# Patient Record
Sex: FEMALE | Race: WHITE | NOT HISPANIC OR LATINO | Employment: OTHER | URBAN - NONMETROPOLITAN AREA
[De-identification: names, ages, dates, MRNs, and addresses within clinical notes are randomized per-mention and may not be internally consistent; named-entity substitution may affect disease eponyms.]

---

## 2017-03-20 ENCOUNTER — COMMUNICATION - GICH (OUTPATIENT)
Dept: FAMILY MEDICINE | Facility: OTHER | Age: 77
End: 2017-03-20

## 2017-03-20 DIAGNOSIS — E78.5 HYPERLIPIDEMIA: ICD-10-CM

## 2017-03-20 DIAGNOSIS — I10 ESSENTIAL (PRIMARY) HYPERTENSION: ICD-10-CM

## 2017-03-20 DIAGNOSIS — I26.99 OTHER PULMONARY EMBOLISM WITHOUT ACUTE COR PULMONALE (H): ICD-10-CM

## 2017-05-19 ENCOUNTER — COMMUNICATION - GICH (OUTPATIENT)
Dept: FAMILY MEDICINE | Facility: OTHER | Age: 77
End: 2017-05-19

## 2017-05-19 DIAGNOSIS — I26.99 OTHER PULMONARY EMBOLISM WITHOUT ACUTE COR PULMONALE (H): ICD-10-CM

## 2017-05-26 ENCOUNTER — OFFICE VISIT - GICH (OUTPATIENT)
Dept: FAMILY MEDICINE | Facility: OTHER | Age: 77
End: 2017-05-26

## 2017-05-26 ENCOUNTER — HISTORY (OUTPATIENT)
Dept: FAMILY MEDICINE | Facility: OTHER | Age: 77
End: 2017-05-26

## 2017-05-26 DIAGNOSIS — I10 ESSENTIAL (PRIMARY) HYPERTENSION: ICD-10-CM

## 2017-05-26 DIAGNOSIS — E78.5 HYPERLIPIDEMIA: ICD-10-CM

## 2017-05-26 DIAGNOSIS — I26.99 OTHER PULMONARY EMBOLISM WITHOUT ACUTE COR PULMONALE (H): ICD-10-CM

## 2017-05-26 LAB
A/G RATIO - HISTORICAL: 1.1 (ref 1–2)
ALBUMIN SERPL-MCNC: 3.8 G/DL (ref 3.5–5.7)
ALP SERPL-CCNC: 74 IU/L (ref 34–104)
ALT (SGPT) - HISTORICAL: 12 IU/L (ref 7–52)
ANION GAP - HISTORICAL: 10 (ref 5–18)
AST SERPL-CCNC: 17 IU/L (ref 13–39)
BILIRUB SERPL-MCNC: 0.7 MG/DL (ref 0.3–1)
BUN SERPL-MCNC: 17 MG/DL (ref 7–25)
BUN/CREAT RATIO - HISTORICAL: 18
CALCIUM SERPL-MCNC: 9.6 MG/DL (ref 8.6–10.3)
CHLORIDE SERPLBLD-SCNC: 99 MMOL/L (ref 98–107)
CHOL/HDL RATIO - HISTORICAL: 2
CHOLESTEROL TOTAL: 190 MG/DL
CO2 SERPL-SCNC: 30 MMOL/L (ref 21–31)
CREAT SERPL-MCNC: 0.93 MG/DL (ref 0.7–1.3)
GFR IF NOT AFRICAN AMERICAN - HISTORICAL: 59 ML/MIN/1.73M2
GLOBULIN - HISTORICAL: 3.5 G/DL (ref 2–3.7)
GLUCOSE SERPL-MCNC: 113 MG/DL (ref 70–105)
HDLC SERPL-MCNC: 95 MG/DL (ref 23–92)
LDLC SERPL CALC-MCNC: 82 MG/DL
NON-HDL CHOLESTEROL - HISTORICAL: 95 MG/DL
PATIENT STATUS - HISTORICAL: ABNORMAL
POTASSIUM SERPL-SCNC: 3.7 MMOL/L (ref 3.5–5.1)
PROT SERPL-MCNC: 7.3 G/DL (ref 6.4–8.9)
SODIUM SERPL-SCNC: 139 MMOL/L (ref 133–143)
TRIGL SERPL-MCNC: 65 MG/DL

## 2017-10-25 ENCOUNTER — OFFICE VISIT - GICH (OUTPATIENT)
Dept: FAMILY MEDICINE | Facility: OTHER | Age: 77
End: 2017-10-25

## 2017-10-25 ENCOUNTER — HISTORY (OUTPATIENT)
Dept: FAMILY MEDICINE | Facility: OTHER | Age: 77
End: 2017-10-25

## 2017-10-25 DIAGNOSIS — Z79.01 LONG TERM CURRENT USE OF ANTICOAGULANT: ICD-10-CM

## 2017-10-25 DIAGNOSIS — R23.8 OTHER SKIN CHANGES: ICD-10-CM

## 2017-10-25 DIAGNOSIS — I26.99 OTHER PULMONARY EMBOLISM WITHOUT ACUTE COR PULMONALE (H): ICD-10-CM

## 2017-10-26 ENCOUNTER — AMBULATORY - GICH (OUTPATIENT)
Dept: FAMILY MEDICINE | Facility: OTHER | Age: 77
End: 2017-10-26

## 2017-10-26 DIAGNOSIS — T14.8XXA OTHER INJURY OF UNSPECIFIED BODY REGION, INITIAL ENCOUNTER (CODE): ICD-10-CM

## 2017-10-27 ENCOUNTER — AMBULATORY - GICH (OUTPATIENT)
Dept: LAB | Facility: OTHER | Age: 77
End: 2017-10-27

## 2017-10-27 DIAGNOSIS — T14.8XXA OTHER INJURY OF UNSPECIFIED BODY REGION, INITIAL ENCOUNTER (CODE): ICD-10-CM

## 2017-10-27 LAB
ABSOLUTE BASOPHILS - HISTORICAL: 0.1 THOU/CU MM
ABSOLUTE EOSINOPHILS - HISTORICAL: 0 THOU/CU MM
ABSOLUTE IMMATURE GRANULOCYTES(METAS,MYELOS,PROS) - HISTORICAL: 0 THOU/CU MM
ABSOLUTE LYMPHOCYTES - HISTORICAL: 1.9 THOU/CU MM (ref 0.9–2.9)
ABSOLUTE MONOCYTES - HISTORICAL: 0.7 THOU/CU MM
ABSOLUTE NEUTROPHILS - HISTORICAL: 5.9 THOU/CU MM (ref 1.7–7)
BASOPHILS # BLD AUTO: 0.6 %
EOSINOPHIL NFR BLD AUTO: 0.4 %
ERYTHROCYTE [DISTWIDTH] IN BLOOD BY AUTOMATED COUNT: 13.7 % (ref 11.5–15.5)
HCT VFR BLD AUTO: 44.3 % (ref 33–51)
HEMOGLOBIN: 14.3 G/DL (ref 12–16)
IMMATURE GRANULOCYTES(METAS,MYELOS,PROS) - HISTORICAL: 0.4 %
LYMPHOCYTES NFR BLD AUTO: 21.7 % (ref 20–44)
MCH RBC QN AUTO: 31.8 PG (ref 26–34)
MCHC RBC AUTO-ENTMCNC: 32.3 G/DL (ref 32–36)
MCV RBC AUTO: 98 FL (ref 80–100)
MONOCYTES NFR BLD AUTO: 8.3 %
NEUTROPHILS NFR BLD AUTO: 68.6 % (ref 42–72)
PLATELET # BLD AUTO: 283 THOU/CU MM (ref 140–440)
PMV BLD: 9.8 FL (ref 6.5–11)
RED BLOOD COUNT - HISTORICAL: 4.5 MIL/CU MM (ref 4–5.2)
WHITE BLOOD COUNT - HISTORICAL: 8.5 THOU/CU MM (ref 4.5–11)

## 2017-12-28 NOTE — PROGRESS NOTES
Patient Information     Patient Name MRN Lelo Carlson [id number] Female 1940      Progress Notes by Whit Cortez MD at 10/25/2017  9:15 AM     Author:  Whit Cortez MD Service:  (none) Author Type:  Physician     Filed:  10/26/2017  4:03 PM Encounter Date:  10/25/2017 Status:  Signed     :  Whit Cortez MD (Physician)            SUBJECTIVE:  Lelo Ríos is a 77 y.o. female who has a history of PE and is on xarelto daily and has been for about a year and was sitting reading yesterday and had a cramp sensation in right wrist area and then rubbed it fairly vigorously and later when she looked at it was quite bruised. It became more extensively bruised as the day went on. She has no other bruising or bleeding issues. It is really not sore but she was worried about a clot is not a bruise. As noted history of PE and was initially on warfarin but then after going off had recurrence so she is now on long-term medication and decided to use Xarelto.  Social History     Social History        Marital status:  Single     Spouse name: N/A     Number of children:  N/A     Years of education:  N/A     Occupational History      Not on file.     Social History Main Topics         Smoking status:   Former Smoker     Smokeless tobacco:   Never Used      Comment: previously a social smoker      Alcohol use   Yes      Comment: occassionally      Drug use:   No     Sexual activity:   Not on file     Other Topics  Concern     Not on file      Social History Narrative     Single.  No children but did raise twin boys for a period of time.     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     She has recently retired from her position at [employer].     She also works for [employer].                       OBJECTIVE:  /90  Pulse 84  Temp 97.2  F (36.2  C) (Tympanic)  Wt 88.4 kg (194 lb 12.8 oz)  Breastfeeding? No   BMI 31.44 kg/m2    Appearance: in no apparent distress.  Right wrist volar aspect with a large ecchymotic area that is nontender. No evidence to suggest clotting as she was worried about. This is a contusion or bruise only and suspect she had a venous bleed and her wrist area.    ASSESSMENT:  1. Easy bruising    2. Recurrent pulmonary embolism (HC)    3. Anticoagulant long-term use          PLAN:  Reassurance that this will resolve. After the patient left I reviewed her labs and she has never had a CBC or platelet count while on Xarelto which can cause a drop. She was contacted and advised to come back for CBC only.  Whit Cortez MD  4:03 PM 10/26/2017   Portions of this dictation were created using the Dragon Nuance voice recognition system. Proofreading was completed but there may be errors in text.

## 2017-12-28 NOTE — PATIENT INSTRUCTIONS
Patient Information     Patient Name MRN Lelo Carlson [id number] Female 1940      Patient Instructions by Whit Cortez MD at 10/25/2017  9:43 AM     Author:  Whit Cortez MD  Service:  (none) Author Type:  Physician     Filed:  10/25/2017  9:43 AM  Encounter Date:  10/25/2017 Status:  Addendum     :  Whit Cortez MD (Physician)        Related Notes: Original Note by Whit Cortez MD (Physician) filed at 10/25/2017  9:43 AM               Index Tajik   Bruise   ________________________________________________________________________  KEY POINTS    A bruise is an injury that causes bleeding under the skin that becomes an area of discolored skin.    Most of the time you don t need to see your healthcare provider for treatment. Your body will repair the bruised area and your skin will return to a normal color.    Rest the part of your body that is bruised if it is painful.  ________________________________________________________________________  What is a bruise?   A bruise is an injury that causes bleeding under the skin that becomes an area of discolored skin. Another word for bruise is contusion.  What is the cause?  A bruise is caused by a break in the small blood vessels under your skin. Blood pools under your skin and causes your skin to look a different color. Bruises often result from an injury, like when you fall or get hit by something.  There are things that may make you bruise more easily, such as medicines or supplements, a lack of certain vitamins, some medical conditions or a blood-clotting problem. Older adults bruise more easily because their blood vessels are more fragile and their skin thins with age.  What are the symptoms?   Symptoms may include pain, swelling, and discolored skin. Bruises can be very small or very large. Some bruises may cause only a little tenderness, but deep bruises of muscles can be painful. Bruises are usually blue, dark red, or  purple at first and then slowly fade over a couple of weeks to shades of brown, yellow, and green.  How are they treated?   Most of the time you don t need to see your healthcare provider for treatment. Your body will repair the bruised area and your skin will return to a normal color.  Some bruises can be a sign of a more serious injury or illness, such as:    Bruises on the abdomen, chest, or lower back    Bruising around both eyes after injury to the head    Bruises that are very swollen and painful    Bruises that appear for no reason or large bruises following only minor bumps or injuries  See your healthcare provider for these types of bruises to make sure they are not symptoms of a serious problem.  How can I take care of myself?   To keep swelling down and help relieve pain for the first few days after an injury:     Rest the part of your body that is bruised if it is painful.    Put an ice pack, cold gel pack, or package of frozen vegetables wrapped in a cloth on the bruised area every 3 to 4 hours for up to 20 minutes at a time for the first day or two after the injury.    Lightly wrap the bruised area with an elastic bandage (Ace wrap) or soft cloth if it is swollen.    Keep your bruised arm or leg up on pillows when you sit or lie down.    Take nonprescription pain medicine, such as acetaminophen, ibuprofen, or naproxen. Read the label and take as directed. Unless recommended by your healthcare provider, you should not take these medicines for more than 10 days.    Nonsteroidal anti-inflammatory medicines (NSAIDs), such as ibuprofen, naproxen, and aspirin, may cause stomach bleeding and other problems. These risks increase with age.    Acetaminophen may cause liver damage or other problems. Unless recommended by your provider, don't take more than 3000 milligrams (mg) in 24 hours. To make sure you don t take too much, check other medicines you take to see if they also contain acetaminophen. Ask your  provider if you need to avoid drinking alcohol while taking this medicine.    Don t massage the bruised area.    After 2 to 3 days, if the swelling is gone, moist heat may help relieve pain, relax your muscles, and make it easier to move. Put moist heat on the sore area for up to 30 minutes to relieve pain. Moist heat includes heat patches or moist heating pads that you can buy at most drugstores, a warm wet washcloth, or a hot shower. To prevent burns to your skin, follow directions on the package and do not lie on any type of hot pad.   Don't use heat on the bruise for the first 2 to 3 days. Heat may make bruising and swelling worse.  Most bruises go away in a couple weeks. Severe bruises cause deep tissue damage and may take several weeks to heal.  Developed by [employer].  Adult Advisor 2016.3 published by [employer].  Last modified: 2016-05-12  Last reviewed: 2014-06-02  This content is reviewed periodically and is subject to change as new health information becomes available. The information is intended to inform and educate and is not a replacement for medical evaluation, advice, diagnosis or treatment by a healthcare professional.  References   Adult Advisor 2016.3 Index    Copyright   2016 [employer], a division of [employer] All rights reserved.

## 2017-12-30 NOTE — NURSING NOTE
Patient Information     Patient Name MRN Lelo Carlson [id number] Female 1940      Nursing Note by Lyn Buitrago at 10/25/2017  9:15 AM     Author:  Lyn Buitrago Service:  (none) Author Type:  (none)     Filed:  10/25/2017  9:35 AM Encounter Date:  10/25/2017 Status:  Signed     :  Lyn Buitrago            Patient presents today for right wrist problem.  Patient did not injure her wrist, but she felt like she had a cramp in her wrist yesterday.  Patient noticed later in the day her wrist turning black and blue.   Lyn Buitrago LPN..............10/25/2017 9:29 AM

## 2018-01-03 NOTE — TELEPHONE ENCOUNTER
Patient Information     Patient Name MRN Sex Lelo Isabel [id number] Female 1940      Telephone Encounter by Musa Randhawa RN at 3/20/2017  3:07 PM     Author:  Musa Randhawa RN Service:  (none) Author Type:  NURS- Registered Nurse     Filed:  3/20/2017  3:15 PM Encounter Date:  3/20/2017 Status:  Signed     :  Musa Randhawa RN (NURS- Registered Nurse)            Angiotensin Receptor Blockers (ARB)    Office visit in the past 12 months or per provider note.    Last visit with KELSI DIAS was on: 2016 in [facility]  Next visit with KELSI DIAS is on: No future appointment listed with this provider  Next visit with Family Practice is on: No future appointment listed in this department    Lab test requirements:  Creatinine and Potassium annually, if ordering lab, order BMP.  CREATININE (mg/dL)    Date Value   2016 0.93     POTASSIUM (mmol/L)    Date Value   2016 4.0       Max refill for 12 months from last office visit or per provider note    Statins    Office visit in the past 12 months.    Last visit with KELSI DIAS was on: 2016 in [facility]  Next visit with KELSI DIAS is on: No future appointment listed with this provider  Next visit with Family Practice is on: No future appointment listed in this department    Last Lipids:  Chol: 182    2/15/2016  T    2/15/2016  HDL:   84    2/15/2016  LDL:  83    2/15/2016  LDL DIRECT:  No results found in past 5 years    .    Max refills 12 months from last office visit.     Patient is due for medication management appointment for HTN and hyperlipidemia and annual labs with relation to hyperlipidemia. Last office visit to address both above dxs on 16 per chart review. Limited refill provided at this time and letter sent for reminder to patient. Prescription refilled per RN Medication Refill Policy.................... Musa Randhawa RN ....................  3/20/2017   3:08 PM

## 2018-01-04 NOTE — TELEPHONE ENCOUNTER
Patient Information     Patient Name MRN Sex Lelo Isabel [id number] Female 1940      Telephone Encounter by Musa Randhawa RN at 3/20/2017  3:02 PM     Author:  Musa Randhawa RN Service:  (none) Author Type:  NURS- Registered Nurse     Filed:  3/20/2017  3:15 PM Encounter Date:  3/20/2017 Status:  Signed     :  Musa Randhawa RN (NURS- Registered Nurse)            Redundant Refill Request for xarelto refused;    Prescribing Provider: Kelsi Dias MD                 Order Date: 2016  Ordered by: KELSI DIAS  Medication:rivaroxaban (XARELTO) 20 mg tablet    Qty:90 tablet   Ref:3  Start:2016   End:              Route:Oral                Class:eRx    Sig:Take 1 tablet by mouth once daily with evening meal. Start after you have         completed the twice daily regimen for the first 21 days.    Pharmacy:[address]    Unable to complete prescription refill per RN Medication Refill Policy.................... Musa Randhawa RN ....................  3/20/2017   3:05 PM

## 2018-01-04 NOTE — TELEPHONE ENCOUNTER
Patient Information     Patient Name MRN Lelo Carlson [id number] Female 1940      Telephone Encounter by Musa Randhawa RN at 3/22/2017  9:18 AM     Author:  Musa Randhawa RN Service:  (none) Author Type:  NURS- Registered Nurse     Filed:  3/22/2017  9:21 AM Encounter Date:  3/20/2017 Status:  Signed     :  Musa Randhawa RN (NURS- Registered Nurse)            Additional faxed request received from [facility] in [facility] for patient's xarelto 20 mg tabs. Chart review shows that rx was filled for a years supply on 16 as listed below:    Prescribing Provider: Robert Dias MD                 Order Date: 2016  Ordered by: ROBERT DIAS  Medication:rivaroxaban (XARELTO) 20 mg tablet    Qty:90 tablet   Ref:3  Start:2016   End:              Route:Oral                Class:eRx    Sig:Take 1 tablet by mouth once daily with evening meal. Start after you have         completed the twice daily regimen for the first 21 days.    Pharmacy:[place] [id number] [place] [address]    Writer had previously refused rx request on 3/20/17. Call placed to [facility] pharmacy. Spoke to [facility], pharmacist. Advised her of above. [facility] unsure why her system is sending additional requests as she sees rx as listed above in her system. [facility] will fix issue on her end, and refill rx as previously prescribed. Nothing further needed from this writer per pharmacist.    Unable to complete prescription refill per RN Medication Refill Policy.................... Musa Randhawa RN ....................  3/22/2017   9:21 AM

## 2018-01-05 NOTE — NURSING NOTE
Patient Information     Patient Name MRLelo Ellis [id number] Female 1940      Nursing Note by Shyla Valle at 2017  9:00 AM     Author:  Shyla Valle Service:  (none) Author Type:  NURS- Licensed Practical Nurse     Filed:  2017  9:04 AM Encounter Date:  2017 Status:  Signed     :  Shyla Valle            Patient presents today for medication management.  Shyla Valle LPN .............2017  8:48 AM

## 2018-01-05 NOTE — TELEPHONE ENCOUNTER
Patient Information     Patient Name MRN Sex Lelo Isabel [id number] Female 1940      Telephone Encounter by Twyla Pike RN at 2017  4:07 PM     Author:  Twyla Pike RN Service:  (none) Author Type:  NURS- Registered Nurse     Filed:  2017  4:15 PM Encounter Date:  2017 Status:  Signed     :  Twyla Pike RN (NURS- Registered Nurse)            This is a Refill request from: [facility]  Name of Medication: Xarelto 20 mg  Quantity requested: 90  Last fill date: 16 #90 with 3 refills  Due for refill: yes  Last visit with ROBERT DIAS was on: 2016 in [facility]  PCP:  Robert Dias MD  Controlled Substance Agreement:  N/A   Diagnosis r/t this medication request: PE     Unable to complete prescription refill per RN Medication Refill Policy (Not on our guideline of medications to refill).................... TWYLA PIKE RN ....................  2017   4:08 PM

## 2018-01-05 NOTE — PROGRESS NOTES
Patient Information     Patient Name MRN Sex Lelo Isabel [id number] Female 1940      Progress Notes by Robert Herrera MD at 2017  9:00 AM     Author:  Robert Herrera MD Service:  (none) Author Type:  Physician     Filed:  2017  9:17 AM Encounter Date:  2017 Status:  Signed     :  Robert Herrera MD (Physician)            SUBJECTIVE:  Lelo Ríos is a 76 y.o. female here for follow-up.  Patient has a history of recurrent thromboembolism with DVTs and PEs. She has been on Xarelto for a year and is doing well. She's not had any significant bleeding or bruising.    She also has a history of hyperlipidemia and hypertension, both of which have been stable. She is due for fasting labs.    She's had no chest pain, short of breath, headaches, vision change, nausea, vomiting, diarrhea, constipation, bladder changes or skin concerns. She does have some chronic left lower extremity edema which is where she had a DVT previously.      Patient Active Problem List       Diagnosis  Date Noted     Recurrent pulmonary embolism (HC)  2016     HYPERTENSION       HYPERLIPIDEMIA       MENOPAUSE, SURGICAL       RENALDO/BSO          FIBROCYSTIC BREAST DISEASE       With right breast mass.          OBESITY       Mild            Past Medical History:     Diagnosis  Date     HERPES ZOSTER 2012    right side, back.       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       atorvastatin (LIPITOR) 20 mg tablet Take 0.5 tablets by mouth once daily. 45 tablet 3     CALCIUM CARBONATE/VITAMIN D2 (CALCIUM 600 + D ORAL) Take 1 Tab by mouth 2 times daily.       latanoprost (XALATAN) 0.005 % ophthalmic solution Place 1 Drop into both eyes at bedtime. 2.5 mL 0     rivaroxaban (XARELTO) 20 mg tablet Take 1 tablet by mouth once daily with evening meal. 90 tablet 3     valsartan-hydrochlorothiazide (DIOVAN HCT) 160-25 mg per tablet Take 1 tablet by mouth once daily. 90 tablet 3     No current facility-administered medications for this visit.      Medications have been reviewed by me and are current to the best of my knowledge and ability.      Allergies:  No Known Allergies    Family History       Problem   Relation Age of Onset     Cancer-breast  No Family History      Heart Disease  Father      Other  Mother      liver cancer         Social History       Substance Use Topics         Smoking status:   Former Smoker     Smokeless tobacco:   Never Used      Comment: previously a social smoker      Alcohol use   Yes       Comment: occassionally        ROS:    As above otherwise ROS is unremarkable.      OBJECTIVE:  /86  Pulse 84  Ht 1.676 m (5' 6\")  Wt 87 kg (191 lb 12.8 oz)  BMI 30.96 kg/m2    EXAM:  General Appearance: Pleasant, alert, appropriate appearance for age. No acute distress  Head: Normal. Normocephalic, atraumatic.  Eyes: PERRL, EOMI  Ears: Normal TM's bilaterally. Normal auditory canals and external ears.   OroPharynx: Dental hygiene adequate. Normal buccal mucosa. Normal pharynx.  Neck: Supple, no masses or nodes, no lymphadenopathy.  No thyromegaly.  Lungs: Normal chest wall and respirations. Clear to auscultation, no wheezes or crackles.  Cardiovascular: Regular rate and rhythm. S1, S2, no murmurs.  Gastrointestinal: Soft, nontender, no abnormal masses or organomegaly. BS normal.  Musculoskeletal: Trace left lower extremity " edema.  Skin: no concerning or new rashes.  Neurologic Exam: CN 2-12 grossly intact.  Normal gait.  Symmetric DTRs, No focal motor or sensory deficits. No tremor.  Psychiatric Exam: Alert and oriented, appropriate affect.    ASSESSEMENT AND PLAN:    Lelo was seen today for medication management.    Diagnoses and all orders for this visit:    Essential hypertension  -     valsartan-hydrochlorothiazide (DIOVAN HCT) 160-25 mg per tablet; Take 1 tablet by mouth once daily.    Hyperlipidemia, unspecified hyperlipidemia type  -     atorvastatin (LIPITOR) 20 mg tablet; Take 0.5 tablets by mouth once daily.  -     LIPID PANEL; Future  -     COMPLETE METABOLIC PANEL; Future    Recurrent pulmonary embolism (HC)  -     rivaroxaban (XARELTO) 20 mg tablet; Take 1 tablet by mouth once daily with evening meal.    Blood pressure is controlled, continue current regimen. We'll get fasting labs today. Continue half tablet of Lipitor daily.    She'll continue Xarelto she is tolerating this well.    She's up-to-date on immunizations, will be due for tetanus in 2021.      Rah Herrera MD

## 2018-01-23 ENCOUNTER — DOCUMENTATION ONLY (OUTPATIENT)
Dept: FAMILY MEDICINE | Facility: OTHER | Age: 78
End: 2018-01-23

## 2018-01-23 PROBLEM — N60.19 FIBROCYSTIC BREAST DISEASE: Status: ACTIVE | Noted: 2018-01-23

## 2018-01-23 PROBLEM — I10 HYPERTENSION: Status: ACTIVE | Noted: 2018-01-23

## 2018-01-23 PROBLEM — E66.9 OBESITY: Status: ACTIVE | Noted: 2018-01-23

## 2018-01-23 PROBLEM — E89.41 SYMPTOMATIC STATES ASSOCIATED WITH ARTIFICIAL MENOPAUSE: Status: ACTIVE | Noted: 2018-01-23

## 2018-01-23 PROBLEM — E78.5 HYPERLIPIDEMIA: Status: ACTIVE | Noted: 2018-01-23

## 2018-01-23 PROBLEM — Z79.01 ANTICOAGULANT LONG-TERM USE: Status: ACTIVE | Noted: 2017-10-26

## 2018-01-23 RX ORDER — VALSARTAN AND HYDROCHLOROTHIAZIDE 160; 25 MG/1; MG/1
1 TABLET ORAL DAILY
COMMUNITY
Start: 2017-05-26 | End: 2018-05-25

## 2018-01-23 RX ORDER — LATANOPROST 50 UG/ML
1 SOLUTION/ DROPS OPHTHALMIC AT BEDTIME
COMMUNITY
Start: 2014-06-11

## 2018-01-23 RX ORDER — ATORVASTATIN CALCIUM 20 MG/1
10 TABLET, FILM COATED ORAL DAILY
COMMUNITY
Start: 2017-05-26 | End: 2018-06-04

## 2018-01-27 VITALS
BODY MASS INDEX: 31.44 KG/M2 | WEIGHT: 194.8 LBS | TEMPERATURE: 97.2 F | DIASTOLIC BLOOD PRESSURE: 90 MMHG | HEART RATE: 84 BPM | SYSTOLIC BLOOD PRESSURE: 142 MMHG

## 2018-01-27 VITALS
HEIGHT: 66 IN | DIASTOLIC BLOOD PRESSURE: 86 MMHG | SYSTOLIC BLOOD PRESSURE: 134 MMHG | WEIGHT: 191.8 LBS | HEART RATE: 84 BPM | BODY MASS INDEX: 30.82 KG/M2

## 2018-05-15 ENCOUNTER — TELEPHONE (OUTPATIENT)
Dept: FAMILY MEDICINE | Facility: OTHER | Age: 78
End: 2018-05-15

## 2018-05-15 DIAGNOSIS — I26.99 RECURRENT PULMONARY EMBOLISM (H): Primary | ICD-10-CM

## 2018-05-15 NOTE — TELEPHONE ENCOUNTER
Received a fax from [facility] requesting a new rx for xarelto 20 mg tablets.  Shyla Valle LPN .............5/15/2018  10:30 AM

## 2018-05-23 DIAGNOSIS — I10 BENIGN ESSENTIAL HYPERTENSION: Primary | ICD-10-CM

## 2018-05-29 RX ORDER — VALSARTAN AND HYDROCHLOROTHIAZIDE 160; 25 MG/1; MG/1
1 TABLET ORAL DAILY
Qty: 90 TABLET | Refills: 3 | Status: SHIPPED | OUTPATIENT
Start: 2018-05-29 | End: 2019-05-21

## 2018-05-30 DIAGNOSIS — E78.00 PURE HYPERCHOLESTEROLEMIA: Primary | ICD-10-CM

## 2018-06-05 RX ORDER — ATORVASTATIN CALCIUM 20 MG/1
10 TABLET, FILM COATED ORAL DAILY
Qty: 90 TABLET | Refills: 3 | Status: SHIPPED | OUTPATIENT
Start: 2018-06-05 | End: 2019-05-21

## 2018-07-23 NOTE — PROGRESS NOTES
Patient Information     Patient Name  Lelo Ríos MRN  [id number] Sex  Female   1940      Letter by Robert Herrera MD at      Author:  Robert Herrera MD Service:  (none) Author Type:  (none)    Filed:   Encounter Date:  3/20/2017 Status:  (Other)           Lelo Ríos  [address] [facility] [address]          2017    Dear Ms. Ríos:    This is to remind you that you are due for your 1 year medication management appointment with Robert Herrera MD in relation to Hypertension and Hyperlipidemia.  Your last visit was on 16. Additional refills of your medication require you to complete this visit.    Please call [phone] to schedule your appointment.    Thank you for choosing [facility] for your health care needs.    Sincerely,      Refill RN  [facility]

## 2018-07-23 NOTE — PROGRESS NOTES
Patient Information     Patient Name  Lelo Ríos MRN  [id number] Sex  Female   1940      Letter by Robert Herrera MD at      Author:  Robert Herrera MD Service:  (none) Author Type:  (none)    Filed:   Encounter Date:  2017 Status:  (Other)           Lelo Ríos  [address]  [facility] [address]          May 26, 2017    Dear Ms. Ríos:    Your recent lab values can be seen below.     Your cholesterol panel, diabetes screening with fasting glucose, liver and kidney testing all came back normal. This is reassuring and should be checked again in one year.    If you have any questions, do not hesitate to contact me.    Results for orders placed or performed in visit on 17      LIPID PANEL      Result  Value Ref Range    CHOLESTEROL,TOTAL 190 <200 mg/dL    TRIGLYCERIDES 65 <150 mg/dL    HDL CHOLESTEROL 95 (H) 23 - 92 mg/dL    NON-HDL CHOLESTEROL 95 <145 mg/dl    CHOL/HDL RATIO            2.00 <4.50                    LDL CHOLESTEROL 82 <100 mg/dL    PATIENT STATUS            FASTING                   COMPLETE METABOLIC PANEL      Result  Value Ref Range    SODIUM 139 133 - 143 mmol/L    POTASSIUM 3.7 3.5 - 5.1 mmol/L    CHLORIDE 99 98 - 107 mmol/L    CO2,TOTAL 30 21 - 31 mmol/L    ANION GAP 10 5 - 18                    GLUCOSE 113 (H) 70 - 105 mg/dL    CALCIUM 9.6 8.6 - 10.3 mg/dL    BUN 17 7 - 25 mg/dL    CREATININE 0.93 0.70 - 1.30 mg/dL    BUN/CREAT RATIO           18                    GFR if African American >60 >60 ml/min/1.73m2    GFR if not African American 59 (L) >60 ml/min/1.73m2    ALBUMIN 3.8 3.5 - 5.7 g/dL    PROTEIN,TOTAL 7.3 6.4 - 8.9 g/dL    GLOBULIN                  3.5 2.0 - 3.7 g/dL    A/G RATIO 1.1 1.0 - 2.0                    BILIRUBIN,TOTAL 0.7 0.3 - 1.0 mg/dL    ALK PHOSPHATASE 74 34 - 104 IU/L    ALT (SGPT) 12 7 - 52 IU/L    AST (SGOT) 17 13 - 39 IU/L         Sincerely,        Rah Herrera MD  Family Medicine

## 2019-01-24 ENCOUNTER — TELEPHONE (OUTPATIENT)
Dept: FAMILY MEDICINE | Facility: OTHER | Age: 79
End: 2019-01-24

## 2019-01-24 NOTE — TELEPHONE ENCOUNTER
"Fax from pharmacy received stating \"please send replacement rx for valsartan/HCTZ due to recall\"    Contacted [facility] target to find out if patient needs a different medication sent to replace the valsartan/HCTZ or if she just needs a refill of this.      Pharmacy tech will speak with his manager and call back to verify.  GRZEGORZ Eli........................1/24/2019  9:24 AM    "

## 2019-05-05 DIAGNOSIS — I26.99 RECURRENT PULMONARY EMBOLISM (H): ICD-10-CM

## 2019-05-05 NOTE — LETTER
May 8, 2019      Lelo Ríos  [address]  [facility] [address]        Dear Lelo,     This letter is to remind you that you are due for your annual exam and labs with Robert Herrera. Your last comprehensive visit was more than 12 months ago.    Please call the clinic at [phone] to schedule your appointment.    If you should require additional refills before your scheduled appointment, please contact your pharmacy and we will refill your medication until the date of your appointment.    If you are no longer seeing Robert Herrera for primary care, please call to let us know. Doing so will remove you from our call/contact list.      Thank you for choosing [facility] for your health care needs.    Sincerely,    Refill FELY  [facility]

## 2019-05-07 NOTE — TELEPHONE ENCOUNTER
[facility] sent Rx request for the following:      XARELTO 20 MG TABLET  Sig: TAKE 1 TABLET (20 MG) BY MOUTH DAILY (WITH DINNER)  Last Prescription Date:   5/15/18  Last Fill Qty/Refills:         90, R-3    Last Office Visit:              10/25/17  Future Office visit:           None  Direct Oral Anticoagulant Agents Failed5/7 4:43 PM   Normal Platelets on file in past 12 months    Creatinine Clearance greater than 50 ml/min on file in past 3 mos    Serum creatinine less than or equal to 1.4 on file in past 3 mos    Recent (6 mo) or future (30 days) visit within the authorizing provider's specialty     Patient overdue for annual exam and labs. Attempted reaching to assist her in scheduling appointment. Left message on machine to call back. Radha Adan RN .............. 5/7/2019  4:49 PM

## 2019-05-08 ENCOUNTER — TELEPHONE (OUTPATIENT)
Dept: FAMILY MEDICINE | Facility: OTHER | Age: 79
End: 2019-05-08

## 2019-05-08 RX ORDER — RIVAROXABAN 20 MG/1
TABLET, FILM COATED ORAL
Qty: 90 TABLET | Refills: 3 | Status: SHIPPED | OUTPATIENT
Start: 2019-05-08 | End: 2020-04-27

## 2019-05-08 NOTE — TELEPHONE ENCOUNTER
Unable to reach Patient. Reminder letter sent. If taking as directed, Pt would run out of medication tomorrow.    In clinical absence of patient's primary, Robert Herrera, patient is requesting that this message be sent to the primary provider's Teamlet for consideration please.  DWS scheduled to return Friday 5/10.    Radha Adan RN .............. 5/8/2019  9:12 AM

## 2019-05-08 NOTE — TELEPHONE ENCOUNTER
Patient states was calling May back, per notes patient due for office visit. Patient notified of refill sent to pharmacy and need for visit, patient transferred to schedule.  Francia Mason LPN .............5/8/2019     3:07 PM

## 2019-05-21 ENCOUNTER — OFFICE VISIT (OUTPATIENT)
Dept: FAMILY MEDICINE | Facility: OTHER | Age: 79
End: 2019-05-21
Attending: FAMILY MEDICINE
Payer: MEDICARE

## 2019-05-21 VITALS
DIASTOLIC BLOOD PRESSURE: 82 MMHG | HEART RATE: 88 BPM | RESPIRATION RATE: 18 BRPM | SYSTOLIC BLOOD PRESSURE: 136 MMHG | HEIGHT: 66 IN | BODY MASS INDEX: 28.93 KG/M2 | WEIGHT: 180 LBS

## 2019-05-21 DIAGNOSIS — I10 BENIGN ESSENTIAL HYPERTENSION: ICD-10-CM

## 2019-05-21 DIAGNOSIS — I26.99 RECURRENT PULMONARY EMBOLISM (H): Primary | ICD-10-CM

## 2019-05-21 DIAGNOSIS — E78.00 PURE HYPERCHOLESTEROLEMIA: ICD-10-CM

## 2019-05-21 PROCEDURE — 99213 OFFICE O/P EST LOW 20 MIN: CPT | Performed by: FAMILY MEDICINE

## 2019-05-21 PROCEDURE — G0463 HOSPITAL OUTPT CLINIC VISIT: HCPCS

## 2019-05-21 RX ORDER — VALSARTAN AND HYDROCHLOROTHIAZIDE 160; 25 MG/1; MG/1
1 TABLET ORAL DAILY
Qty: 90 TABLET | Refills: 3 | Status: SHIPPED | OUTPATIENT
Start: 2019-05-21 | End: 2020-07-17

## 2019-05-21 RX ORDER — ATORVASTATIN CALCIUM 20 MG/1
10 TABLET, FILM COATED ORAL DAILY
Qty: 90 TABLET | Refills: 3 | Status: SHIPPED | OUTPATIENT
Start: 2019-05-21 | End: 2020-08-03

## 2019-05-21 ASSESSMENT — PATIENT HEALTH QUESTIONNAIRE - PHQ9: SUM OF ALL RESPONSES TO PHQ QUESTIONS 1-9: 0

## 2019-05-21 ASSESSMENT — PAIN SCALES - GENERAL: PAINLEVEL: NO PAIN (0)

## 2019-05-21 ASSESSMENT — MIFFLIN-ST. JEOR: SCORE: 1313.22

## 2019-05-21 NOTE — NURSING NOTE
Patient presents today for annual medication renewal.  Medication Reconciliation Complete    Lyn Buitrago LPN  5/21/2019 11:23 AM

## 2019-05-21 NOTE — PROGRESS NOTES
SUBJECTIVE:  Lelo Ríos is a 78 year old female here for annual exam.  She has a history of hypertension hyperlipidemia.  She continues on Xarelto for recurrent pulmonary embolus.  She has had no bleeding or other side effects.    She is otherwise doing well and has no concerns today.      Patient Active Problem List    Diagnosis Date Noted     Fibrocystic breast disease 01/23/2018     Priority: Medium     Overview:   With right breast mass.       Hyperlipidemia 01/23/2018     Priority: Medium     Hypertension 01/23/2018     Priority: Medium     Symptomatic states associated with artificial menopause 01/23/2018     Priority: Medium     Overview:   RENALDO/BSO       Obesity 01/23/2018     Priority: Medium     Overview:   Mild       Anticoagulant long-term use 10/26/2017     Priority: Medium     Overview:   On long-term Xarelto       Recurrent pulmonary embolism (H) 06/13/2016     Priority: Medium       Past Medical History:   Diagnosis Date     Personal history of pulmonary embolism     5/15/2015     Personal history of transient ischemic attack (TIA), and cerebral infarction without residual deficits     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       Current Outpatient Medications   Medication Sig Dispense Refill     aspirin 81 MG tablet Take 81 mg by mouth daily       atorvastatin (LIPITOR) 20 MG tablet Take 0.5 tablets (10 mg) by mouth daily 90 tablet 3     Calcium Carb-Cholecalciferol (CALCIUM 600 + D PO) Take 1 tablet by mouth 2 times daily       latanoprost (XALATAN) 0.005 %  "ophthalmic solution Place 1 drop into both eyes At Bedtime       valsartan-hydrochlorothiazide (DIOVAN HCT) 160-25 MG tablet Take 1 tablet by mouth daily 90 tablet 3     XARELTO 20 MG TABS tablet TAKE 1 TABLET (20 MG) BY MOUTH DAILY (WITH DINNER) 90 tablet 3       Allergies:  No Known Allergies    Family History   Problem Relation Age of Onset     Heart Disease Father         Heart Disease     Other - See Comments Mother         liver cancer     Breast Cancer No family hx of         Cancer-breast       Social History     Tobacco Use     Smoking status: Former Smoker     Smokeless tobacco: Never Used     Tobacco comment: Quit smoking: previously a social smoker   Substance Use Topics     Alcohol use: Yes     Comment: Alcoholic Drinks/day: occassionally     Drug use: Unknown     Types: Other     Comment: Drug use: No       ROS:    As above otherwise ROS is unremarkable.      OBJECTIVE:  /82   Pulse 88   Resp 18   Ht 1.676 m (5' 6\")   Wt 81.6 kg (180 lb)   BMI 29.05 kg/m      EXAM:  General Appearance: Pleasant, alert, appropriate appearance for age. No acute distress  Lungs: Normal chest wall and respirations. Clear to auscultation, no wheezes or crackles.  Cardiovascular: Regular rate and rhythm. S1, S2, no murmurs.  Musculoskeletal: No edema.  Skin: no concerning or new rashes.  Neurologic Exam: CN 2-12 grossly intact.  Normal gait.    Psychiatric Exam: Alert and oriented, appropriate affect.    ASSESSEMENT AND PLAN:    1. Recurrent pulmonary embolism (H)    2. Benign essential hypertension    3. Pure hypercholesterolemia      Medications refilled again from the year.    She will follow-up at her convenience for fasting labs.    She will pursue the new shingles vaccine on her own.    She is otherwise up-to-date on immunizations.    Rah Herrera MD  Family Medicine      This document was prepared using voice generated software.  While every attempt was made for accuracy, grammatical errors may exist.  "

## 2019-05-23 DIAGNOSIS — E78.00 PURE HYPERCHOLESTEROLEMIA: ICD-10-CM

## 2019-05-23 LAB
ALBUMIN SERPL-MCNC: 3.7 G/DL (ref 3.5–5.7)
ALP SERPL-CCNC: 63 U/L (ref 34–104)
ALT SERPL W P-5'-P-CCNC: 13 U/L (ref 7–52)
ANION GAP SERPL CALCULATED.3IONS-SCNC: 6 MMOL/L (ref 3–14)
AST SERPL W P-5'-P-CCNC: 19 U/L (ref 13–39)
BILIRUB SERPL-MCNC: 0.7 MG/DL (ref 0.3–1)
BUN SERPL-MCNC: 15 MG/DL (ref 7–25)
CALCIUM SERPL-MCNC: 9.4 MG/DL (ref 8.6–10.3)
CHLORIDE SERPL-SCNC: 99 MMOL/L (ref 98–107)
CHOLEST SERPL-MCNC: 178 MG/DL
CO2 SERPL-SCNC: 34 MMOL/L (ref 21–31)
CREAT SERPL-MCNC: 0.87 MG/DL (ref 0.6–1.2)
GFR SERPL CREATININE-BSD FRML MDRD: 63 ML/MIN/{1.73_M2}
GLUCOSE SERPL-MCNC: 116 MG/DL (ref 70–105)
HDLC SERPL-MCNC: 76 MG/DL (ref 23–92)
LDLC SERPL CALC-MCNC: 86 MG/DL
NONHDLC SERPL-MCNC: 102 MG/DL
POTASSIUM SERPL-SCNC: 3.9 MMOL/L (ref 3.5–5.1)
PROT SERPL-MCNC: 6.8 G/DL (ref 6.4–8.9)
SODIUM SERPL-SCNC: 139 MMOL/L (ref 134–144)
TRIGL SERPL-MCNC: 82 MG/DL

## 2019-05-23 PROCEDURE — 80061 LIPID PANEL: CPT | Performed by: FAMILY MEDICINE

## 2019-05-23 PROCEDURE — 36415 COLL VENOUS BLD VENIPUNCTURE: CPT | Performed by: FAMILY MEDICINE

## 2019-05-23 PROCEDURE — 80053 COMPREHEN METABOLIC PANEL: CPT | Performed by: FAMILY MEDICINE

## 2019-09-25 ENCOUNTER — DOCUMENTATION ONLY (OUTPATIENT)
Dept: FAMILY MEDICINE | Facility: OTHER | Age: 79
End: 2019-09-25

## 2019-10-01 ENCOUNTER — DOCUMENTATION ONLY (OUTPATIENT)
Dept: FAMILY MEDICINE | Facility: OTHER | Age: 79
End: 2019-10-01

## 2019-11-22 ENCOUNTER — OFFICE VISIT (OUTPATIENT)
Dept: FAMILY MEDICINE | Facility: OTHER | Age: 79
End: 2019-11-22
Attending: FAMILY MEDICINE
Payer: MEDICARE

## 2019-11-22 VITALS
DIASTOLIC BLOOD PRESSURE: 88 MMHG | WEIGHT: 182.6 LBS | OXYGEN SATURATION: 98 % | BODY MASS INDEX: 29.35 KG/M2 | HEIGHT: 66 IN | RESPIRATION RATE: 20 BRPM | SYSTOLIC BLOOD PRESSURE: 130 MMHG | HEART RATE: 74 BPM | TEMPERATURE: 97.9 F

## 2019-11-22 DIAGNOSIS — R10.9 ACUTE LEFT FLANK PAIN: Primary | ICD-10-CM

## 2019-11-22 PROCEDURE — 99213 OFFICE O/P EST LOW 20 MIN: CPT | Performed by: FAMILY MEDICINE

## 2019-11-22 PROCEDURE — G0463 HOSPITAL OUTPT CLINIC VISIT: HCPCS

## 2019-11-22 ASSESSMENT — ENCOUNTER SYMPTOMS
FEVER: 0
NAUSEA: 0
VOMITING: 0
CHILLS: 0

## 2019-11-22 ASSESSMENT — MIFFLIN-ST. JEOR: SCORE: 1320.02

## 2019-11-22 ASSESSMENT — PAIN SCALES - GENERAL: PAINLEVEL: NO PAIN (0)

## 2019-11-22 NOTE — NURSING NOTE
"Chief Complaint   Patient presents with     Back Pain     x 3 days     Low back hurts off and on for 3 days.    Initial /88   Pulse 74   Temp 97.9  F (36.6  C) (Temporal)   Resp 20   Ht 1.676 m (5' 6\")   Wt 82.8 kg (182 lb 9.6 oz)   SpO2 98%   BMI 29.47 kg/m   Estimated body mass index is 29.47 kg/m  as calculated from the following:    Height as of this encounter: 1.676 m (5' 6\").    Weight as of this encounter: 82.8 kg (182 lb 9.6 oz).    Medication Reconciliation: complete      Norma J. Gosselin, LPN  "

## 2019-11-22 NOTE — PROGRESS NOTES
"  SUBJECTIVE:   Lelo Ríos is a 79 year old female who presents to clinic today for the following health issues:    HPI  Left Flank Pain: Pain began approximately 4 to 5 days ago.  She describes the pain as an \"ache\" and states that it comes and goes.  It is worse with movement, particularly flexion of her left hip.  Is relieved with rest.  She denies pleuritic pain, numbness, tingling, dysuria, and urinary urgency or frequency.  She has no history of back problems.  She has tried nothing to treat her symptoms so far as she was concerned about taking over-the-counter pain relievers due to the fact that she is on Xarelto.    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     Family History   Problem Relation Age of Onset     Heart Disease Father         Heart Disease     Other - See Comments Mother         liver cancer     Breast Cancer No family hx of         Cancer-breast     Social History     Tobacco Use     Smoking status: Former Smoker     Smokeless tobacco: Never Used     Tobacco comment: Quit smoking: previously a social smoker   Substance Use Topics     Alcohol use: Yes     Comment: Alcoholic Drinks/day: occassionally     Current Outpatient Medications   Medication Sig Dispense Refill     aspirin 81 MG tablet Take 81 mg by mouth daily       atorvastatin (LIPITOR) 20 MG tablet Take " "0.5 tablets (10 mg) by mouth daily 90 tablet 3     Calcium Carb-Cholecalciferol (CALCIUM 600 + D PO) Take 1 tablet by mouth 2 times daily       latanoprost (XALATAN) 0.005 % ophthalmic solution Place 1 drop into both eyes At Bedtime       valsartan-hydrochlorothiazide (DIOVAN HCT) 160-25 MG tablet Take 1 tablet by mouth daily 90 tablet 3     XARELTO 20 MG TABS tablet TAKE 1 TABLET (20 MG) BY MOUTH DAILY (WITH DINNER) 90 tablet 3     No Known Allergies    Review of Systems   Constitutional: Negative for chills and fever.   Gastrointestinal: Negative for nausea and vomiting.      OBJECTIVE:     /88   Pulse 74   Temp 97.9  F (36.6  C) (Temporal)   Resp 20   Ht 1.676 m (5' 6\")   Wt 82.8 kg (182 lb 9.6 oz)   SpO2 98%   BMI 29.47 kg/m    Body mass index is 29.47 kg/m .  Physical Exam  Constitutional:       General: She is not in acute distress.     Appearance: Normal appearance. She is not ill-appearing.   Cardiovascular:      Rate and Rhythm: Normal rate and regular rhythm.   Pulmonary:      Effort: Pulmonary effort is normal.      Breath sounds: Normal breath sounds.   Abdominal:      General: Bowel sounds are normal.      Palpations: Abdomen is soft.      Tenderness: There is no abdominal tenderness. There is no right CVA tenderness, left CVA tenderness, guarding or rebound.   Musculoskeletal:      Comments: Increased thoracic kyphosis.  No tenderness to palpation of cervical, thoracic, or lumbar spinous processes.  No tenderness to palpation of cervical, thoracic, or lumbar paraspinal musculature.  Lower extremity strength 5 out of 5 bilaterally.  Toe and heel walk intact.   Neurological:      Mental Status: She is alert.   Psychiatric:         Mood and Affect: Mood normal.       ASSESSMENT/PLAN:     1. Acute left flank pain  Appears to be musculoskeletal in etiology.  Recommend ice/heat and topical pain relievers as needed for symptom management.  If symptoms worsening or failing to improve may benefit " from physical therapy or imaging studies.    Evelyn Phoenix [facility]

## 2020-04-26 DIAGNOSIS — I26.99 RECURRENT PULMONARY EMBOLISM (H): ICD-10-CM

## 2020-04-27 RX ORDER — RIVAROXABAN 20 MG/1
TABLET, FILM COATED ORAL
Qty: 90 TABLET | Refills: 3 | Status: SHIPPED | OUTPATIENT
Start: 2020-04-27 | End: 2021-04-21

## 2020-04-27 NOTE — TELEPHONE ENCOUNTER
[facility] the following:      XARELTO 20 MG TABLET   Sig: TAKE 1 TABLET (20 MG) BY MOUTH DAILY (WITH DINNER)       Last Prescription Date:   5/8/2019  Last Fill Qty/Refills:         90, R-3    Last Office Visit:              5/21/2019   Future Office visit:           none      Requested Prescriptions   Pending Prescriptions Disp Refills     XARELTO ANTICOAGULANT 20 MG TABS tablet [Pharmacy Med Name: XARELTO 20 MG TABLET] 90 tablet 3     Sig: TAKE 1 TABLET (20 MG) BY MOUTH DAILY (WITH DINNER)       Direct Oral Anticoagulant Agents Failed - 4/26/2020  9:10 AM        Failed - Normal Platelets on file in past 12 months     Recent Labs   Lab Test 10/27/17  1216                  Failed - Creatinine Clearance greater than 50 ml/min on file in past 3 mos     No lab results found.          Failed - Serum creatinine less than or equal to 1.4 on file in past 3 mos     Recent Labs   Lab Test 05/23/19  0924   CR 0.87       Ok to refill medication if creatinine is low          Passed - Medication is active on med list        Passed - Patient is 18 years of age or older        Passed - No active pregnancy on record        Passed - No positive pregnancy test within past 12 months        Passed - Recent (6 mo) or future (30 days) visit within the authorizing provider's specialty           Unable to complete prescription refill per RN Medication Refill Policy.................... Ketan Diallo RN ....................  4/27/2020   3:26 PM

## 2020-07-17 DIAGNOSIS — I10 BENIGN ESSENTIAL HYPERTENSION: ICD-10-CM

## 2020-07-17 RX ORDER — VALSARTAN AND HYDROCHLOROTHIAZIDE 160; 25 MG/1; MG/1
TABLET ORAL
Qty: 90 TABLET | Refills: 3 | Status: SHIPPED | OUTPATIENT
Start: 2020-07-17 | End: 2021-07-19

## 2020-07-28 ENCOUNTER — APPOINTMENT (OUTPATIENT)
Dept: GENERAL RADIOLOGY | Facility: OTHER | Age: 80
End: 2020-07-28
Attending: EMERGENCY MEDICINE
Payer: MEDICARE

## 2020-07-28 ENCOUNTER — NURSE TRIAGE (OUTPATIENT)
Dept: FAMILY MEDICINE | Facility: OTHER | Age: 80
End: 2020-07-28

## 2020-07-28 ENCOUNTER — APPOINTMENT (OUTPATIENT)
Dept: CT IMAGING | Facility: OTHER | Age: 80
End: 2020-07-28
Attending: EMERGENCY MEDICINE
Payer: MEDICARE

## 2020-07-28 ENCOUNTER — HOSPITAL ENCOUNTER (EMERGENCY)
Facility: OTHER | Age: 80
Discharge: HOME | End: 2020-07-28
Attending: EMERGENCY MEDICINE | Admitting: EMERGENCY MEDICINE
Payer: MEDICARE

## 2020-07-28 VITALS
SYSTOLIC BLOOD PRESSURE: 122 MMHG | WEIGHT: 177 LBS | TEMPERATURE: 97 F | OXYGEN SATURATION: 97 % | HEART RATE: 87 BPM | DIASTOLIC BLOOD PRESSURE: 78 MMHG | HEIGHT: 66 IN | BODY MASS INDEX: 28.45 KG/M2 | RESPIRATION RATE: 23 BRPM

## 2020-07-28 DIAGNOSIS — R06.02 SHORTNESS OF BREATH: ICD-10-CM

## 2020-07-28 LAB
ANION GAP SERPL CALCULATED.3IONS-SCNC: 8 MMOL/L (ref 3–14)
APTT PPP: 31 SEC (ref 22–37)
BASOPHILS # BLD AUTO: 0 10E9/L (ref 0–0.2)
BASOPHILS NFR BLD AUTO: 0.5 %
BUN SERPL-MCNC: 18 MG/DL (ref 7–25)
CALCIUM SERPL-MCNC: 9.8 MG/DL (ref 8.6–10.3)
CHLORIDE SERPL-SCNC: 99 MMOL/L (ref 98–107)
CO2 SERPL-SCNC: 33 MMOL/L (ref 21–31)
CREAT SERPL-MCNC: 0.94 MG/DL (ref 0.6–1.2)
DIFFERENTIAL METHOD BLD: ABNORMAL
EOSINOPHIL # BLD AUTO: 0 10E9/L (ref 0–0.7)
EOSINOPHIL NFR BLD AUTO: 0.5 %
ERYTHROCYTE [DISTWIDTH] IN BLOOD BY AUTOMATED COUNT: 13.2 % (ref 10–15)
GFR SERPL CREATININE-BSD FRML MDRD: 57 ML/MIN/{1.73_M2}
GLUCOSE SERPL-MCNC: 200 MG/DL (ref 70–105)
HCT VFR BLD AUTO: 46.1 % (ref 35–47)
HGB BLD-MCNC: 14.4 G/DL (ref 11.7–15.7)
IMM GRANULOCYTES # BLD: 0 10E9/L (ref 0–0.4)
IMM GRANULOCYTES NFR BLD: 0.4 %
INR PPP: 1.44 (ref 0–1.3)
LYMPHOCYTES # BLD AUTO: 1.3 10E9/L (ref 0.8–5.3)
LYMPHOCYTES NFR BLD AUTO: 17.3 %
MCH RBC QN AUTO: 31.1 PG (ref 26.5–33)
MCHC RBC AUTO-ENTMCNC: 31.2 G/DL (ref 31.5–36.5)
MCV RBC AUTO: 100 FL (ref 78–100)
MONOCYTES # BLD AUTO: 0.6 10E9/L (ref 0–1.3)
MONOCYTES NFR BLD AUTO: 8 %
NEUTROPHILS # BLD AUTO: 5.5 10E9/L (ref 1.6–8.3)
NEUTROPHILS NFR BLD AUTO: 73.3 %
NT-PROBNP SERPL-MCNC: 44 PG/ML (ref 0–100)
PLATELET # BLD AUTO: 254 10E9/L (ref 150–450)
POTASSIUM SERPL-SCNC: 3.4 MMOL/L (ref 3.5–5.1)
RBC # BLD AUTO: 4.63 10E12/L (ref 3.8–5.2)
SODIUM SERPL-SCNC: 140 MMOL/L (ref 134–144)
TROPONIN I SERPL-MCNC: 6.1 PG/ML
WBC # BLD AUTO: 7.5 10E9/L (ref 4–11)

## 2020-07-28 PROCEDURE — 85025 COMPLETE CBC W/AUTO DIFF WBC: CPT | Performed by: EMERGENCY MEDICINE

## 2020-07-28 PROCEDURE — U0003 INFECTIOUS AGENT DETECTION BY NUCLEIC ACID (DNA OR RNA); SEVERE ACUTE RESPIRATORY SYNDROME CORONAVIRUS 2 (SARS-COV-2) (CORONAVIRUS DISEASE [COVID-19]), AMPLIFIED PROBE TECHNIQUE, MAKING USE OF HIGH THROUGHPUT TECHNOLOGIES AS DESCRIBED BY CMS-2020-01-R: HCPCS | Performed by: EMERGENCY MEDICINE

## 2020-07-28 PROCEDURE — 25500064 ZZH RX 255 OP 636: Performed by: EMERGENCY MEDICINE

## 2020-07-28 PROCEDURE — 36415 COLL VENOUS BLD VENIPUNCTURE: CPT | Performed by: EMERGENCY MEDICINE

## 2020-07-28 PROCEDURE — 85610 PROTHROMBIN TIME: CPT | Performed by: EMERGENCY MEDICINE

## 2020-07-28 PROCEDURE — 71045 X-RAY EXAM CHEST 1 VIEW: CPT

## 2020-07-28 PROCEDURE — 80048 BASIC METABOLIC PNL TOTAL CA: CPT | Performed by: EMERGENCY MEDICINE

## 2020-07-28 PROCEDURE — 93005 ELECTROCARDIOGRAM TRACING: CPT | Performed by: EMERGENCY MEDICINE

## 2020-07-28 PROCEDURE — 84484 ASSAY OF TROPONIN QUANT: CPT | Performed by: EMERGENCY MEDICINE

## 2020-07-28 PROCEDURE — 93010 ELECTROCARDIOGRAM REPORT: CPT | Performed by: INTERNAL MEDICINE

## 2020-07-28 PROCEDURE — 71275 CT ANGIOGRAPHY CHEST: CPT

## 2020-07-28 PROCEDURE — C9803 HOPD COVID-19 SPEC COLLECT: HCPCS | Performed by: EMERGENCY MEDICINE

## 2020-07-28 PROCEDURE — 99285 EMERGENCY DEPT VISIT HI MDM: CPT | Mod: 25 | Performed by: EMERGENCY MEDICINE

## 2020-07-28 PROCEDURE — 83880 ASSAY OF NATRIURETIC PEPTIDE: CPT | Performed by: EMERGENCY MEDICINE

## 2020-07-28 PROCEDURE — 99284 EMERGENCY DEPT VISIT MOD MDM: CPT | Mod: Z6 | Performed by: EMERGENCY MEDICINE

## 2020-07-28 PROCEDURE — 85730 THROMBOPLASTIN TIME PARTIAL: CPT | Performed by: EMERGENCY MEDICINE

## 2020-07-28 RX ORDER — ALBUTEROL SULFATE 90 UG/1
2 AEROSOL, METERED RESPIRATORY (INHALATION) EVERY 6 HOURS PRN
Status: DISCONTINUED | OUTPATIENT
Start: 2020-07-28 | End: 2020-07-28 | Stop reason: HOSPADM

## 2020-07-28 RX ORDER — IODIXANOL 320 MG/ML
100 INJECTION, SOLUTION INTRAVASCULAR ONCE
Status: COMPLETED | OUTPATIENT
Start: 2020-07-28 | End: 2020-07-28

## 2020-07-28 RX ADMIN — IODIXANOL 100 ML: 320 INJECTION, SOLUTION INTRAVASCULAR at 15:31

## 2020-07-28 ASSESSMENT — MIFFLIN-ST. JEOR: SCORE: 1289.62

## 2020-07-28 NOTE — ED AVS SNAPSHOT
[facility]  [address]  [place] [address]  Phone:  [phone]  Fax:  [phone]                                    Lelo Ríos   MRN: [id number]    Department:  [facility]   Date of Visit:  7/28/2020           After Visit Summary Signature Page    I have received my discharge instructions, and my questions have been answered. I have discussed any challenges I see with this plan with the nurse or doctor.    ..........................................................................................................................................  Patient/Patient Representative Signature      ..........................................................................................................................................  Patient Representative Print Name and Relationship to Patient    ..................................................               ................................................  Date                                   Time    ..........................................................................................................................................  Reviewed by Signature/Title    ...................................................              ..............................................  Date                                               Time          22EPIC Rev 08/18

## 2020-07-28 NOTE — ED TRIAGE NOTES
"Pt comes in with SOB with exersion, has hx of \"blood clots\" pt states that it has been worse the past few days , is on Xelralto   "

## 2020-07-28 NOTE — DISCHARGE INSTRUCTIONS
You have a pulmonary nodule noted on chest CT. Thi is an incidental finding and you should have a repeat CT scan is 6 months

## 2020-07-28 NOTE — ED PROVIDER NOTES
[facility]  Emergency Department Visit Note    Shortness of Breath      History of Present Illness     Lelo Ríos is an 80 year old female presenting with shortness of breath This started approximately 3 to 4 months prior to arrival and the onset is while walking over a block.  She denies any chest pain or exertional chest pain and any associated with fevers, cough, shortness of breath, nausea, vomiting, diaphoresis, change with respiration. The patient has had similar symptoms in the past when she had Pes. She is on xarelto. No falls or other recent injuries.    Medications:  Prior to Admission medications    Medication Sig Last Dose Taking? Auth Provider   aspirin 81 MG tablet Take 81 mg by mouth daily   Reported, Patient   atorvastatin (LIPITOR) 20 MG tablet Take 0.5 tablets (10 mg) by mouth daily   Robert Herrera MD   Calcium Carb-Cholecalciferol (CALCIUM 600 + D PO) Take 1 tablet by mouth 2 times daily   Reported, Patient   latanoprost (XALATAN) 0.005 % ophthalmic solution Place 1 drop into both eyes At Bedtime   Reported, Patient   valsartan-hydrochlorothiazide (DIOVAN HCT) 160-25 MG tablet TAKE 1 TABLET BY MOUTH EVERY DAY   Robert Herrera MD   XARELTO ANTICOAGULANT 20 MG TABS tablet TAKE 1 TABLET (20 MG) BY MOUTH DAILY (WITH DINNER)   Robert Herrera MD       Allergies:  No Known Allergies    Problem List:  Patient Active Problem List   Diagnosis     Anticoagulant long-term use     Fibrocystic breast disease     Hyperlipidemia     Hypertension     Symptomatic states associated with artificial menopause     Obesity     Recurrent pulmonary embolism (H)       Past Medical History:  Past Medical History:   Diagnosis Date     Personal history of pulmonary embolism     5/15/2015     Personal history of transient ischemic attack (TIA), and cerebral infarction without residual deficits     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       Past Surgical History:  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       Social History:  Social History     Tobacco Use     Smoking status: Former Smoker     Smokeless tobacco: Never Used     Tobacco comment: Quit smoking: previously a social smoker   Substance Use Topics     Alcohol use: Yes     Comment: Alcoholic Drinks/day: occassionally     Drug use: Never       Review of Systems:  10 point review of systems obtained and pertinent positive and negative findings noted in HPI. Review of systems otherwise negative.    Physical Exam     Vital signs: /78   Pulse 87   Temp 97  F (36.1  C)   Resp 23   Ht 1.676 m (5' 6\")   Wt 80.3 kg (177 lb)   SpO2 97%   BMI 28.57 kg/m      Physical Exam:    General: awake and alert, comfortable  HEENT: atraumatic, no scleral injection, no nasal discharge, neck supple  Chest: clear to auscultation bilaterally without wheezes or crackles, non labored respirations  Cardiovascular: regular rate and rhythm, no murmurs or gallops  Abdomen: soft, nontender, no rebound or guarding, nondistended  Extremities: no deformities, edema, or tenderness  Skin: warm, dry, no rashes  Neuro: alert and oriented x 3, moving extremities x 4, ambulates without difficulty      Medical Decision Making & ED Course     Lelo Ríos is an 80 year old female presenting with chest pain. Differential includes acute coronary syndrome, pulmonary embolism, aortic dissection, pneumonia, esophageal spasm, gastroesophageal reflux, reactive airway disease, chest wall pain, stress or anxiety. Concern for a life threatening etiology of symptoms prompts EKG, CCTA, and laboratory " evaluation. Given the patient's few risk factors and atypical history for acute coronary syndrome with studies results suggesting low suspicion of a coronary event, the patient is stable for outpatient management and I have ordered an outpatient stress echo. Will trial therapy prescription for albuterol as she did have asymptomatic improvement after treatment in the ED. Patient given instructions on follow-up and warning signs for which to return to ED. All questions were answered and the patient is comfortable with plan for discharge. The patient was discharged in stable condition.    I have reviewed the patients ECG, laboratory studies, imaging, and medical records.    Diagnosis & Disposition     Diagnosis:  1. Shortness of breath        Disposition:  Home    MD Tessa Hoovre Theresa M, MD  07/28/20 5948

## 2020-07-28 NOTE — TELEPHONE ENCOUNTER
"Pt states she has increased SOB throughout the summer and feels similar to when she had her first PE    Pt has hx of PE X 2 and is currently taking Xeralto    Pt states she experiences moderate to severe SOB upon Fine during day and fine upon exertion and could not walk a block.      Denies chest pain/discomfort/pressure, dizziness/lightheadedness, other symptoms.    Advised Pt to present with  to ED and/or call 911 with worsening of symptoms.  Pt states she feels fine to drive and will present to ED.    Reason for Disposition    Any history of prior \"blood clot\" in leg or lungs    Protocols used: BREATHING DIFFICULTY-CATHERINE-RASHEL Leary RN  ....................  7/28/2020   2:04 PM      "

## 2020-07-29 ENCOUNTER — TELEPHONE (OUTPATIENT)
Dept: FAMILY MEDICINE | Facility: OTHER | Age: 80
End: 2020-07-29

## 2020-07-29 NOTE — TELEPHONE ENCOUNTER
Called patient and she really wants to see Robert Herrera MD for her follow up. Explained to her that he is not in the office until Monday. She would like to cancel her appointment with Federico Devlin MD tomorrow and get a call about a work in with her own provider when his nurse returns.  Hui Moran LPN  7/29/2020 10:03 AM

## 2020-07-29 NOTE — TELEPHONE ENCOUNTER
Reason for call: Patient wanting a work in appointment.    Patient is having the following symptoms:  Er f/u for 3 day    The patient is requesting an appointment with  DWS    Was an appointment offered for this call? Yes    If Yes, what is the date of the appointment?  Pt scheduled with [facility] but doesn't want that appt.     Preferred method for responding to this message: Telephone Call    Phone number patient can be reached at? Home number on file [phone] (home)    If we can't reach you directly, may we leave a detailed response at the number you provided? Yes    Can this message wait until your PCP/provider returns if unavailable today? Yes

## 2020-07-30 LAB
INTERPRETATION ECG - MUSE: NORMAL
SARS-COV-2 RNA SPEC QL NAA+PROBE: NOT DETECTED
SPECIMEN SOURCE: NORMAL

## 2020-08-03 ENCOUNTER — OFFICE VISIT (OUTPATIENT)
Dept: FAMILY MEDICINE | Facility: OTHER | Age: 80
End: 2020-08-03
Attending: FAMILY MEDICINE
Payer: MEDICARE

## 2020-08-03 VITALS
WEIGHT: 177 LBS | BODY MASS INDEX: 28.45 KG/M2 | OXYGEN SATURATION: 89 % | DIASTOLIC BLOOD PRESSURE: 86 MMHG | SYSTOLIC BLOOD PRESSURE: 128 MMHG | TEMPERATURE: 97.3 F | HEIGHT: 66 IN | HEART RATE: 68 BPM | RESPIRATION RATE: 24 BRPM

## 2020-08-03 DIAGNOSIS — R06.09 DOE (DYSPNEA ON EXERTION): Primary | ICD-10-CM

## 2020-08-03 DIAGNOSIS — E78.00 PURE HYPERCHOLESTEROLEMIA: ICD-10-CM

## 2020-08-03 PROCEDURE — G0463 HOSPITAL OUTPT CLINIC VISIT: HCPCS

## 2020-08-03 PROCEDURE — 99214 OFFICE O/P EST MOD 30 MIN: CPT | Performed by: FAMILY MEDICINE

## 2020-08-03 RX ORDER — ATORVASTATIN CALCIUM 20 MG/1
10 TABLET, FILM COATED ORAL DAILY
Qty: 90 TABLET | Refills: 3 | Status: SHIPPED | OUTPATIENT
Start: 2020-08-03 | End: 2021-08-09

## 2020-08-03 ASSESSMENT — PAIN SCALES - GENERAL: PAINLEVEL: NO PAIN (0)

## 2020-08-03 ASSESSMENT — MIFFLIN-ST. JEOR: SCORE: 1289.62

## 2020-08-03 NOTE — NURSING NOTE
Patient presents today for ED follow up. Patient has been having shortness of breath with activity.     Medication Reconciliation Complete    Lyn Buitrago LPN  8/3/2020 3:35 PM

## 2020-08-03 NOTE — TELEPHONE ENCOUNTER
Patient was put in Dr. Herrera's schedule this afternoon.    Lyn Buitrago LPN on 8/3/2020 at 8:08 AM

## 2020-08-03 NOTE — PROGRESS NOTES
SUBJECTIVE:  Lelo Ríos is a 80 year old female here for ER follow-up.  She seen the emergency room last week with worsening shortness of breath on exertion.  She states that this been present for last couple of months but seems to have worsened to the point where she felt further evaluation was necessary.  In emergency department she had normal labs as well as a negative CT PE run.  EKG was personally reviewed from July 28 and shows sinus tachycardia without any ischemic changes.  She reports that she has slept with 2 pillows    For many years without any significant change.  She has had no lower extremity edema.  No cough.  She admits that she is not very active and does not exercise on a regular basis.      Patient Active Problem List    Diagnosis Date Noted     Fibrocystic breast disease 01/23/2018     Priority: Medium     Overview:   With right breast mass.       Hyperlipidemia 01/23/2018     Priority: Medium     Hypertension 01/23/2018     Priority: Medium     Symptomatic states associated with artificial menopause 01/23/2018     Priority: Medium     Overview:   RENALDO/BSO       Obesity 01/23/2018     Priority: Medium     Overview:   Mild       Anticoagulant long-term use 10/26/2017     Priority: Medium     Overview:   On long-term Xarelto       Recurrent pulmonary embolism (H) 06/13/2016     Priority: Medium       Past Medical History:   Diagnosis Date     Personal history of pulmonary embolism     5/15/2015     Personal history of transient ischemic attack (TIA), and cerebral infarction without residual deficits     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       Current Outpatient Medications   Medication Sig Dispense Refill     aspirin 81 MG tablet Take 81 mg by mouth daily       atorvastatin (LIPITOR) 20 MG tablet Take 0.5 tablets (10 mg) by mouth daily 90 tablet 3     Calcium Carb-Cholecalciferol (CALCIUM 600 + D PO) Take 1 tablet by mouth 2 times daily       latanoprost (XALATAN) 0.005 % ophthalmic solution Place 1 drop into both eyes At Bedtime       valsartan-hydrochlorothiazide (DIOVAN HCT) 160-25 MG tablet TAKE 1 TABLET BY MOUTH EVERY DAY 90 tablet 3     XARELTO ANTICOAGULANT 20 MG TABS tablet TAKE 1 TABLET (20 MG) BY MOUTH DAILY (WITH DINNER) 90 tablet 3       Allergies:  No Known Allergies    Family History   Problem Relation Age of Onset     Heart Disease Father         Heart Disease     Other - See Comments Mother         liver cancer     Breast Cancer No family hx of         Cancer-breast       Social History     Tobacco Use     Smoking status: Former Smoker     Smokeless tobacco: Never Used     Tobacco comment: Quit smoking: previously a social smoker   Substance Use Topics     Alcohol use: Yes     Comment: Alcoholic Drinks/day: occassionally     Drug use: Never       ROS:    As above otherwise ROS is unremarkable.      OBJECTIVE:  /86   Pulse 68   Temp 97.3  F (36.3  C)   Resp 24   Ht 1.676 m (5' 6\")   Wt 80.3 kg (177 lb)   SpO2 (!) 89%   BMI 28.57 kg/m      EXAM:  General Appearance: Pleasant, alert, appropriate appearance for age. No acute distress  Head: Normal. Normocephalic, atraumatic.  Lungs: Normal chest wall and respirations. Clear to auscultation, no wheezes or crackles.  Cardiovascular: Regular rate and rhythm. S1, S2, no murmurs.  Musculoskeletal: No edema.  Skin: no concerning or new rashes.  Neurologic Exam: CN 2-12 grossly intact.  Normal gait.    Psychiatric Exam: Alert and oriented, appropriate affect.    ASSESSEMENT AND PLAN:    1. CACERES (dyspnea " on exertion)    2. Pure hypercholesterolemia      Labs, EKG and imaging from the ER were reviewed with patient.  No significant abnormalities were noted.  We will set her up for a stress test.  If this is normal we could consider PFTs versus empiric trial of albuterol to see if that helps the shortness of breath given the recent COVID restrictions on PFTs.  Also if this is normal reassurance to be given the no significant causes have been found and could consider an exercise regimen.    Rah Herrera MD  Family Medicine      This document was prepared using voice generated software.  While every attempt was made for accuracy, grammatical errors may exist.

## 2020-08-05 ENCOUNTER — TELEPHONE (OUTPATIENT)
Dept: CARDIOLOGY | Facility: OTHER | Age: 80
End: 2020-08-05

## 2020-08-11 ENCOUNTER — HOSPITAL ENCOUNTER (OUTPATIENT)
Dept: NUCLEAR MEDICINE | Facility: OTHER | Age: 80
End: 2020-08-11
Attending: FAMILY MEDICINE
Payer: MEDICARE

## 2020-08-11 VITALS — OXYGEN SATURATION: 95 % | TEMPERATURE: 96.3 F

## 2020-08-11 DIAGNOSIS — R06.09 DOE (DYSPNEA ON EXERTION): ICD-10-CM

## 2020-08-11 LAB
CV BLOOD PRESSURE: 87 %
CV STRESS MAX HR HE: 96
NUC STRESS EJECTION FRACTION: 85 %
RATE PRESSURE PRODUCT: NORMAL
STRESS ECHO BASELINE DIASTOLIC HE: 80
STRESS ECHO BASELINE HR: 79
STRESS ECHO BASELINE SYSTOLIC BP: 140
STRESS ECHO CALCULATED PERCENT HR: 69 %
STRESS ECHO LAST STRESS DIASTOLIC BP: 80
STRESS ECHO LAST STRESS SYSTOLIC BP: 140
STRESS ECHO POST ESTIMATED WORKLOAD: 1 METS
STRESS ECHO TARGET HR: 140

## 2020-08-11 PROCEDURE — 93018 CV STRESS TEST I&R ONLY: CPT | Performed by: INTERNAL MEDICINE

## 2020-08-11 PROCEDURE — 34300033 ZZH RX 343: Performed by: FAMILY MEDICINE

## 2020-08-11 PROCEDURE — 25000128 H RX IP 250 OP 636: Performed by: INTERNAL MEDICINE

## 2020-08-11 PROCEDURE — 93016 CV STRESS TEST SUPVJ ONLY: CPT | Performed by: INTERNAL MEDICINE

## 2020-08-11 PROCEDURE — 78452 HT MUSCLE IMAGE SPECT MULT: CPT

## 2020-08-11 PROCEDURE — A9500 TC99M SESTAMIBI: HCPCS | Performed by: FAMILY MEDICINE

## 2020-08-11 PROCEDURE — 93017 CV STRESS TEST TRACING ONLY: CPT

## 2020-08-11 RX ORDER — AMINOPHYLLINE 25 MG/ML
50 INJECTION, SOLUTION INTRAVENOUS 2 TIMES DAILY PRN
Status: DISCONTINUED | OUTPATIENT
Start: 2020-08-11 | End: 2020-08-12 | Stop reason: HOSPADM

## 2020-08-11 RX ORDER — REGADENOSON 0.08 MG/ML
0.4 INJECTION, SOLUTION INTRAVENOUS ONCE
Status: COMPLETED | OUTPATIENT
Start: 2020-08-11 | End: 2020-08-11

## 2020-08-11 RX ADMIN — REGADENOSON 0.4 MG: 0.08 INJECTION, SOLUTION INTRAVENOUS at 08:51

## 2020-08-11 RX ADMIN — KIT FOR THE PREPARATION OF TECHNETIUM TC99M SESTAMIBI 31.2 MILLICURIE: 1 INJECTION, POWDER, LYOPHILIZED, FOR SOLUTION PARENTERAL at 08:50

## 2020-08-11 RX ADMIN — KIT FOR THE PREPARATION OF TECHNETIUM TC99M SESTAMIBI 8.48 MILLICURIE: 1 INJECTION, POWDER, LYOPHILIZED, FOR SOLUTION PARENTERAL at 07:10

## 2020-08-11 NOTE — PROGRESS NOTES
0700: The patient arrived for a Lexiscan Cardiolite stress test.  The procedure, risks, and benefits were discussed with the patient ,and the consent was signed.  A saline lock was started,and the Cardiolite was injected by Nuclear Medicine.  The patient was taken to the waiting area, to await resting images at 0735.  0835: The patient returned from Nuclear Medicine and was prepped for the stress test.    arrived, and the patient was administered the Lexiscan per procedure.  The patient tolerated the procedure.  She was given a snack and taken to Nuclear Medicine in stable condition for stress images.  The saline lock will be removed by Nuclear Medicine for proper disposal.  The patient was instructed that the ordering MD will call with results in one to two days.  Please see the chart for the complete test results.

## 2021-02-01 ENCOUNTER — HOSPITAL ENCOUNTER (OUTPATIENT)
Dept: CT IMAGING | Facility: OTHER | Age: 81
Discharge: HOME | End: 2021-02-01
Attending: FAMILY MEDICINE | Admitting: FAMILY MEDICINE
Payer: MEDICARE

## 2021-02-01 ENCOUNTER — TELEPHONE (OUTPATIENT)
Dept: FAMILY MEDICINE | Facility: OTHER | Age: 81
End: 2021-02-01

## 2021-02-01 DIAGNOSIS — R91.8 PULMONARY NODULES: ICD-10-CM

## 2021-02-01 DIAGNOSIS — R91.8 PULMONARY NODULES: Primary | ICD-10-CM

## 2021-02-01 PROCEDURE — 71250 CT THORAX DX C-: CPT | Mod: MG

## 2021-02-01 NOTE — TELEPHONE ENCOUNTER
Patient had a CT chest done in July and she was to have follow up imaging in 6 month for a new lung nodule found.    Lyn Buitrago LPN on 2/1/2021 at 10:05 AM

## 2021-04-20 DIAGNOSIS — I26.99 RECURRENT PULMONARY EMBOLISM (H): ICD-10-CM

## 2021-04-21 RX ORDER — RIVAROXABAN 20 MG/1
TABLET, FILM COATED ORAL
Qty: 90 TABLET | Refills: 1 | Status: SHIPPED | OUTPATIENT
Start: 2021-04-21 | End: 2021-08-09

## 2021-04-21 NOTE — TELEPHONE ENCOUNTER
[facility] sent Rx request for the following:   XARELTO ANTICOAGULANT 20 MG TABS tablet  SigTAKE 1 TABLET BY MOUTH DAILY WITH DINNER    Last Prescription Date:   04/27/2020  Last Fill Qty/Refills:         90, R-3    Last Office Visit:              08/03/2020 (Sharon)   Future Office visit:           None noted   Direct Oral Anticoagulant Agents Failed - 4/20/2021 12:29 AM        Failed - Creatinine Clearance greater than 50 ml/min on file in past 3 mos     No lab results found.          Failed - Serum creatinine less than or equal to 1.4 on file in past 3 mos     Recent Labs   Lab Test 07/28/20  1429   CR 0.94       Ok to refill medication if creatinine is low          Failed - Recent (6 mo) or future (30 days) visit within the authorizing provider's specialty     Routing for PCP review. Patient will be due for annual review 08/2021. Unable to complete prescription refill per RN Medication Refill Policy.................... Nichole Veras RN ....................  4/21/2021   2:31 PM

## 2021-05-06 ENCOUNTER — PATIENT OUTREACH (OUTPATIENT)
Dept: FAMILY MEDICINE | Facility: OTHER | Age: 81
End: 2021-05-06

## 2021-05-06 NOTE — TELEPHONE ENCOUNTER
Patient Quality Outreach      Summary:    Patient has the following on her problem list/HM: None    Patient is due/failing the following:   Annual wellness, date due: 2021    Type of outreach:    Sent letter.    Questions for provider review:    None                                                                                                                                     Gini Dover PA-C  5/6/2021  5:54 PM         Chart routed to N/A.

## 2021-05-06 NOTE — LETTER
May 6, 2021      Lelo Ríos  [address]  [facility] [address]      Your healthcare team cares about your health. To provide you with the best care,   we have reviewed your chart and based on our findings, we see that you are due to:     - ANNUAL WELLNESS FOLLOW UP:   Schedule an Annual Medicare Wellness Exam. This can be done by in person visit or virtual video visit.   - OTHER FOLLOW UP:  Advanced care planning    If you have already completed these items, please contact the clinic via phone or   Mychart so your care team can review and update your records. Thank you for   choosing [facility] for your healthcare needs. For any questions,   concerns, or to schedule an appointment please contact the clinic.       Healthy Regards,      Your [facility] Care Team

## 2021-08-06 NOTE — PROGRESS NOTES
Assessment & Plan     1. Pure hypercholesterolemia  Doing well.  Refilled Lipitor as outlined below.  Lipid panel pending, will notify with patient with results and adjust treatment if indicated.  - atorvastatin (LIPITOR) 20 MG tablet; Take 0.5 tablets (10 mg) by mouth daily  Dispense: 90 tablet; Refill: 3  - Lipid Panel; Future  - Lipid Panel    2. Recurrent pulmonary embolism (H)  Doing well.  Refilled Xarelto as outlined below.  Follow-up as needed.  - rivaroxaban ANTICOAGULANT (XARELTO ANTICOAGULANT) 20 MG TABS tablet; TAKE 1 TABLET BY MOUTH DAILY WITH DINNER  Dispense: 90 tablet; Refill: 3    3. Benign essential hypertension  Stable within goal.  Refilled medication as outlined below.  CBC and BMP pending, notify patient with results.  Handicap paperwork filled out due to breathing issues.   - valsartan-hydrochlorothiazide (DIOVAN HCT) 160-25 MG tablet; Take 1 tablet by mouth daily  Dispense: 90 tablet; Refill: 3  - CBC and Differential; Future  - Basic Metabolic Panel; Future  - CBC and Differential  - Basic Metabolic Panel      Return if symptoms worsen or fail to improve.    Tereza Brito PA-C  [facility]   Lelo is a 81 year old who presents for the following health issues     HPI   Here for medication management.  Questing refills of her blood pressure medication, Xarelto, Lipitor.  She has been stable with these medications.  She is due for labs.  Also requesting repeat paperwork for handicap parking pass.  This was completed about a year ago due to her breathing issues.  She has had a full work-up in the past regarding this.    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       FAMILY HISTORY:   Family History   Problem Relation Age of Onset     Heart Disease Father         Heart Disease     Other - See Comments Mother         liver cancer     Breast Cancer No family hx of         Cancer-breast       SOCIAL HISTORY:   Social History     Tobacco Use     Smoking status: Former Smoker     Smokeless tobacco: Never Used     Tobacco comment: Quit smoking: previously a social smoker   Substance Use Topics     Alcohol use: Yes     Comment: Alcoholic Drinks/day: occassionally      No Known Allergies  Current Outpatient Medications   Medication     aspirin 81 MG tablet     atorvastatin (LIPITOR) 20 MG tablet     Calcium Carb-Cholecalciferol (CALCIUM 600 + D PO)     latanoprost (XALATAN) 0.005 % ophthalmic solution     rivaroxaban ANTICOAGULANT (XARELTO ANTICOAGULANT) 20 MG TABS tablet     valsartan-hydrochlorothiazide (DIOVAN HCT) 160-25 MG tablet     No current facility-administered medications for this visit.         Review of Systems   Per HPI        Objective    /72   Pulse 81   Temp 97.2  F (36.2  C)   Resp 12   Ht 1.676 m (5' 6\")   Wt 79.9 kg (176 lb 3.2 oz)   SpO2 98%   Breastfeeding No   BMI 28.44 kg/m    Body mass index is 28.44 kg/m .  Physical Exam   General: Pleasant, in no apparent distress.  Cardiovascular: Regular rate and rhythm with S1 equal to S2. No murmurs, friction rubs, or gallops.   Respiratory: Lungs are resonant and clear to auscultation bilaterally. No wheezes, crackles, or rhonchi.  Psych: Appropriate mood and affect.            "

## 2021-08-09 ENCOUNTER — OFFICE VISIT (OUTPATIENT)
Dept: FAMILY MEDICINE | Facility: OTHER | Age: 81
End: 2021-08-09
Attending: PHYSICIAN ASSISTANT
Payer: MEDICARE

## 2021-08-09 VITALS
HEART RATE: 81 BPM | RESPIRATION RATE: 12 BRPM | WEIGHT: 176.2 LBS | HEIGHT: 66 IN | OXYGEN SATURATION: 98 % | BODY MASS INDEX: 28.32 KG/M2 | TEMPERATURE: 97.2 F | DIASTOLIC BLOOD PRESSURE: 72 MMHG | SYSTOLIC BLOOD PRESSURE: 132 MMHG

## 2021-08-09 DIAGNOSIS — I26.99 RECURRENT PULMONARY EMBOLISM (H): ICD-10-CM

## 2021-08-09 DIAGNOSIS — E78.00 PURE HYPERCHOLESTEROLEMIA: ICD-10-CM

## 2021-08-09 DIAGNOSIS — I10 BENIGN ESSENTIAL HYPERTENSION: ICD-10-CM

## 2021-08-09 LAB
ANION GAP SERPL CALCULATED.3IONS-SCNC: 7 MMOL/L (ref 3–14)
BASOPHILS # BLD AUTO: 0 10E3/UL (ref 0–0.2)
BASOPHILS NFR BLD AUTO: 1 %
BUN SERPL-MCNC: 14 MG/DL (ref 7–25)
CALCIUM SERPL-MCNC: 9.9 MG/DL (ref 8.6–10.3)
CHLORIDE BLD-SCNC: 98 MMOL/L (ref 98–107)
CHOLEST SERPL-MCNC: 170 MG/DL
CO2 SERPL-SCNC: 34 MMOL/L (ref 21–31)
CREAT SERPL-MCNC: 0.88 MG/DL (ref 0.6–1.2)
EOSINOPHIL # BLD AUTO: 0.1 10E3/UL (ref 0–0.7)
EOSINOPHIL NFR BLD AUTO: 1 %
ERYTHROCYTE [DISTWIDTH] IN BLOOD BY AUTOMATED COUNT: 13.6 % (ref 10–15)
FASTING STATUS PATIENT QL REPORTED: NORMAL
GFR SERPL CREATININE-BSD FRML MDRD: 62 ML/MIN/1.73M2
GLUCOSE BLD-MCNC: 108 MG/DL (ref 70–105)
HCT VFR BLD AUTO: 45.9 % (ref 35–47)
HDLC SERPL-MCNC: 69 MG/DL (ref 23–92)
HGB BLD-MCNC: 14.7 G/DL (ref 11.7–15.7)
IMM GRANULOCYTES # BLD: 0 10E3/UL
IMM GRANULOCYTES NFR BLD: 0 %
LDLC SERPL CALC-MCNC: 87 MG/DL
LYMPHOCYTES # BLD AUTO: 1.4 10E3/UL (ref 0.8–5.3)
LYMPHOCYTES NFR BLD AUTO: 19 %
MCH RBC QN AUTO: 31.9 PG (ref 26.5–33)
MCHC RBC AUTO-ENTMCNC: 32 G/DL (ref 31.5–36.5)
MCV RBC AUTO: 100 FL (ref 78–100)
MONOCYTES # BLD AUTO: 0.6 10E3/UL (ref 0–1.3)
MONOCYTES NFR BLD AUTO: 8 %
NEUTROPHILS # BLD AUTO: 5.1 10E3/UL (ref 1.6–8.3)
NEUTROPHILS NFR BLD AUTO: 71 %
NONHDLC SERPL-MCNC: 101 MG/DL
NRBC # BLD AUTO: 0 10E3/UL
NRBC BLD AUTO-RTO: 0 /100
PLATELET # BLD AUTO: 264 10E3/UL (ref 150–450)
POTASSIUM BLD-SCNC: 3.6 MMOL/L (ref 3.5–5.1)
RBC # BLD AUTO: 4.61 10E6/UL (ref 3.8–5.2)
SODIUM SERPL-SCNC: 139 MMOL/L (ref 134–144)
TRIGL SERPL-MCNC: 71 MG/DL
WBC # BLD AUTO: 7.2 10E3/UL (ref 4–11)

## 2021-08-09 PROCEDURE — 80048 BASIC METABOLIC PNL TOTAL CA: CPT | Mod: ZL | Performed by: PHYSICIAN ASSISTANT

## 2021-08-09 PROCEDURE — G0463 HOSPITAL OUTPT CLINIC VISIT: HCPCS

## 2021-08-09 PROCEDURE — 99214 OFFICE O/P EST MOD 30 MIN: CPT | Performed by: PHYSICIAN ASSISTANT

## 2021-08-09 PROCEDURE — 36415 COLL VENOUS BLD VENIPUNCTURE: CPT | Mod: ZL | Performed by: PHYSICIAN ASSISTANT

## 2021-08-09 PROCEDURE — 85025 COMPLETE CBC W/AUTO DIFF WBC: CPT | Mod: ZL | Performed by: PHYSICIAN ASSISTANT

## 2021-08-09 PROCEDURE — 80061 LIPID PANEL: CPT | Mod: ZL | Performed by: PHYSICIAN ASSISTANT

## 2021-08-09 RX ORDER — VALSARTAN AND HYDROCHLOROTHIAZIDE 160; 25 MG/1; MG/1
1 TABLET ORAL DAILY
Qty: 90 TABLET | Refills: 3 | Status: SHIPPED | OUTPATIENT
Start: 2021-08-09 | End: 2022-08-16

## 2021-08-09 RX ORDER — ATORVASTATIN CALCIUM 20 MG/1
10 TABLET, FILM COATED ORAL DAILY
Qty: 90 TABLET | Refills: 3 | Status: SHIPPED | OUTPATIENT
Start: 2021-08-09 | End: 2022-08-16

## 2021-08-09 ASSESSMENT — ANXIETY QUESTIONNAIRES
5. BEING SO RESTLESS THAT IT IS HARD TO SIT STILL: NOT AT ALL
7. FEELING AFRAID AS IF SOMETHING AWFUL MIGHT HAPPEN: NOT AT ALL
2. NOT BEING ABLE TO STOP OR CONTROL WORRYING: NOT AT ALL
3. WORRYING TOO MUCH ABOUT DIFFERENT THINGS: NOT AT ALL
IF YOU CHECKED OFF ANY PROBLEMS ON THIS QUESTIONNAIRE, HOW DIFFICULT HAVE THESE PROBLEMS MADE IT FOR YOU TO DO YOUR WORK, TAKE CARE OF THINGS AT HOME, OR GET ALONG WITH OTHER PEOPLE: NOT DIFFICULT AT ALL
GAD7 TOTAL SCORE: 0
6. BECOMING EASILY ANNOYED OR IRRITABLE: NOT AT ALL
1. FEELING NERVOUS, ANXIOUS, OR ON EDGE: NOT AT ALL

## 2021-08-09 ASSESSMENT — PATIENT HEALTH QUESTIONNAIRE - PHQ9: 5. POOR APPETITE OR OVEREATING: NOT AT ALL

## 2021-08-09 ASSESSMENT — MIFFLIN-ST. JEOR: SCORE: 1280.99

## 2021-08-09 ASSESSMENT — PAIN SCALES - GENERAL: PAINLEVEL: NO PAIN (0)

## 2021-08-09 NOTE — LETTER
August 10, 2021      [place]elo [facility]os  [address] [facility] [facility] [address]        Dear ,    We are writing to inform you of your test results. Your labs were normal.     Resulted Orders   Basic Metabolic Panel   Result Value Ref Range    Sodium 139 134 - 144 mmol/L    Potassium 3.6 3.5 - 5.1 mmol/L    Chloride 98 98 - 107 mmol/L    Carbon Dioxide (CO2) 34 (H) 21 - 31 mmol/L    Anion Gap 7 3 - 14 mmol/L    Urea Nitrogen 14 7 - 25 mg/dL    Creatinine 0.88 0.60 - 1.20 mg/dL    Calcium 9.9 8.6 - 10.3 mg/dL    Glucose 108 (H) 70 - 105 mg/dL    GFR Estimate 62 >60 mL/min/1.73m2      Comment:      As of July 11, 2021, eGFR is calculated by the CKD-EPI creatinine equation, without race adjustment. eGFR can be influenced by muscle mass, exercise, and diet. The reported eGFR is an estimation only and is only applicable if the renal function is stable.   Lipid Panel   Result Value Ref Range    Cholesterol 170 <200 mg/dL      Comment:      Age 0-19 years  Desirable: <170 mg/dL  Borderline high:  170-199 mg/dl  High:            >199 mg/dl    Age 20 years and older  Desirable: <200 mg/dL    Triglycerides 71 <150 mg/dL      Comment:      0-9 years:  Normal:    Less than 75 mg/dL  Borderline high:  75-99 mg/dL  High:             Greater than or equal to 100 mg/dL    0-19 years:  Normal:    Less than 90 mg/dL  Borderline high:   mg/dL  High:             Greater than or equal to 130 mg/dL    20 years and older:  Normal:    Less than 150 mg/dL  Borderline high:  150-199 mg/dL  High:             200-499 mg/dL  Very high:   Greater than or equal to 500 mg/dL    Direct Measure HDL 69 23 - 92 mg/dL      Comment:      0-19 years:       Greater than or equal to 45 mg/dL   Low: Less than 40 mg/dL   Borderline low: 40-44 mg/dL     20 years and older:   Female: Greater than or equal to 50 mg/dL   Male:   Greater than or equal to 40 mg/dL         LDL Cholesterol Calculated 87 <=100 mg/dL      Comment:      Age 0-19  years:  Desirable: 0-110 mg/dL   Borderline high: 110-129 mg/dL   High: >= 130 mg/dL    Age 20 years and older:  Desirable: <100mg/dL  Above desirable: 100-129 mg/dL   Borderline high: 130-159 mg/dL   High: 160-189 mg/dL   Very high: >= 190 mg/dL    Non HDL Cholesterol 101 <130 mg/dL      Comment:      0-19 years:  Desirable:          Less than 120 mg/dL  Borderline high:   120-144 mg/dL  High:                   Greater than or equal to 145 mg/dL    20 years and older:  Desirable:          130 mg/dL  Above Desirable: 130-159 mg/dL  Borderline high:   160-189 mg/dL  High:               190-219 mg/dL  Very high:     Greater than or equal to 220 mg/dL    Patient Fasting > 8hrs? Unknown    CBC with platelets and differential   Result Value Ref Range    WBC Count 7.2 4.0 - 11.0 10e3/uL    RBC Count 4.61 3.80 - 5.20 10e6/uL    Hemoglobin 14.7 11.7 - 15.7 g/dL    Hematocrit 45.9 35.0 - 47.0 %     78 - 100 fL    MCH 31.9 26.5 - 33.0 pg    MCHC 32.0 31.5 - 36.5 g/dL    RDW 13.6 10.0 - 15.0 %    Platelet Count 264 150 - 450 10e3/uL    % Neutrophils 71 %    % Lymphocytes 19 %    % Monocytes 8 %    % Eosinophils 1 %    % Basophils 1 %    % Immature Granulocytes 0 %    NRBCs per 100 WBC 0 <1 /100    Absolute Neutrophils 5.1 1.6 - 8.3 10e3/uL    Absolute Lymphocytes 1.4 0.8 - 5.3 10e3/uL    Absolute Monocytes 0.6 0.0 - 1.3 10e3/uL    Absolute Eosinophils 0.1 0.0 - 0.7 10e3/uL    Absolute Basophils 0.0 0.0 - 0.2 10e3/uL    Absolute Immature Granulocytes 0.0 <=0.0 10e3/uL    Absolute NRBCs 0.0 10e3/uL       If you have any questions or concerns, please call the clinic at the number listed above.       Sincerely,      Tereza Brito PA-C

## 2021-08-09 NOTE — NURSING NOTE
Patient presents to clinic for medication refill and renewal of handicap sticker.  Marcela Martinez LPN ....................  8/9/2021   12:31 PM

## 2021-08-10 ASSESSMENT — ANXIETY QUESTIONNAIRES: GAD7 TOTAL SCORE: 0

## 2022-07-24 DIAGNOSIS — I26.99 RECURRENT PULMONARY EMBOLISM (H): ICD-10-CM

## 2022-07-25 RX ORDER — RIVAROXABAN 20 MG/1
TABLET, FILM COATED ORAL
Qty: 90 TABLET | Refills: 3 | Status: SHIPPED | OUTPATIENT
Start: 2022-07-25 | End: 2022-09-02

## 2022-07-25 NOTE — TELEPHONE ENCOUNTER
[facility] following:      XARELTO 20 MG TABLET      Last Prescription Date:   8/9/2021  Last Fill Qty/Refills:         90, R-3    Last Office Visit:              8/9/2021   Future Office visit:           none    Ketan Diallo RN, BSN  ....................  7/25/2022   3:39 PM

## 2022-08-16 DIAGNOSIS — E78.00 PURE HYPERCHOLESTEROLEMIA: ICD-10-CM

## 2022-08-16 DIAGNOSIS — I10 BENIGN ESSENTIAL HYPERTENSION: ICD-10-CM

## 2022-08-16 RX ORDER — VALSARTAN AND HYDROCHLOROTHIAZIDE 160; 25 MG/1; MG/1
TABLET ORAL
Qty: 90 TABLET | Refills: 3 | Status: SHIPPED | OUTPATIENT
Start: 2022-08-16 | End: 2022-09-02

## 2022-08-16 RX ORDER — ATORVASTATIN CALCIUM 20 MG/1
10 TABLET, FILM COATED ORAL DAILY
Qty: 45 TABLET | Refills: 3 | Status: SHIPPED | OUTPATIENT
Start: 2022-08-16 | End: 2022-09-02

## 2022-08-16 NOTE — TELEPHONE ENCOUNTER
[id number] [place] sent Rx request for the following:      Requested Prescriptions   Pending Prescriptions Disp Refills     atorvastatin (LIPITOR) 20 MG tablet [Pharmacy Med Name: ATORVASTATIN 20 MG TABLET] 45 tablet 7     Sig: TAKE 0.5 TABLETS (10 MG) BY MOUTH DAILY   Last Prescription Date:   8/9/21  Last Fill Qty/Refills:         90, R-3      Statins Protocol Failed - 8/16/2022 11:17 AM        Failed - LDL on file in past 12 months     Recent Labs   Lab Test 08/09/21  1255   LDL 87           Failed - Recent (12 mo) or future (30 days) visit within the authorizing provider's specialty        valsartan-hydrochlorothiazide (DIOVAN HCT) 160-25 MG tablet [Pharmacy Med Name: VALSARTAN-HCTZ 160-25 MG TAB] 90 tablet 3     Sig: TAKE 1 TABLET BY MOUTH EVERY DAY   Last Prescription Date:   8/9/21  Last Fill Qty/Refills:         90, R-3      Angiotensin-II Receptors Failed - 8/16/2022 11:18 AM        Failed - Last blood pressure under 140/90 in past 12 months     BP Readings from Last 3 Encounters:   08/09/21 132/72   08/03/20 128/86   07/28/20 122/78           Failed - Recent (12 mo) or future (30 days) visit within the authorizing provider's specialty        Failed - Normal serum creatinine on file in past 12 months     Recent Labs   Lab Test 08/09/21  1255   CR 0.88           Failed - Normal serum potassium on file in past 12 months     Recent Labs   Lab Test 08/09/21  1255   POTASSIUM 3.6       Diuretics (Including Combos) Protocol Failed - 8/16/2022 11:18 AM        Failed - Blood pressure under 140/90 in past 12 months     BP Readings from Last 3 Encounters:   08/09/21 132/72   08/03/20 128/86   07/28/20 122/78           Failed - Recent (12 mo) or future (30 days) visit within the authorizing provider's specialty        Failed - Normal serum creatinine on file in past 12 months     Recent Labs   Lab Test 08/09/21  1255   CR 0.88           Failed - Normal serum potassium on file in past 12 months      Recent Labs   Lab Test 08/09/21  1255   POTASSIUM 3.6           Failed - Normal serum sodium on file in past 12 months     Recent Labs   Lab Test 08/09/21  1255           Last Office Visit:              8/9/21   Future Office visit:           None    Pt due for annual exam. Routing to provider for refill consideration. Routing to  OUTREACH APPT REQUESTS pool, to assist Pt in scheduling appointment.     Radha Adan RN .............. 8/16/2022  11:20 AM

## 2022-09-02 ENCOUNTER — OFFICE VISIT (OUTPATIENT)
Dept: FAMILY MEDICINE | Facility: OTHER | Age: 82
End: 2022-09-02
Attending: FAMILY MEDICINE
Payer: MEDICARE

## 2022-09-02 VITALS
RESPIRATION RATE: 18 BRPM | SYSTOLIC BLOOD PRESSURE: 146 MMHG | HEART RATE: 93 BPM | OXYGEN SATURATION: 94 % | DIASTOLIC BLOOD PRESSURE: 94 MMHG | WEIGHT: 183.6 LBS | TEMPERATURE: 98.3 F | BODY MASS INDEX: 29.51 KG/M2 | HEIGHT: 66 IN

## 2022-09-02 DIAGNOSIS — Z00.00 ENCOUNTER FOR MEDICARE ANNUAL WELLNESS EXAM: Primary | ICD-10-CM

## 2022-09-02 DIAGNOSIS — I10 BENIGN ESSENTIAL HYPERTENSION: ICD-10-CM

## 2022-09-02 DIAGNOSIS — I26.99 RECURRENT PULMONARY EMBOLISM (H): ICD-10-CM

## 2022-09-02 DIAGNOSIS — E78.00 PURE HYPERCHOLESTEROLEMIA: ICD-10-CM

## 2022-09-02 LAB
ALBUMIN SERPL-MCNC: 3.7 G/DL (ref 3.5–5.7)
ALP SERPL-CCNC: 70 U/L (ref 34–104)
ALT SERPL W P-5'-P-CCNC: 9 U/L (ref 7–52)
ANION GAP SERPL CALCULATED.3IONS-SCNC: 7 MMOL/L (ref 3–14)
AST SERPL W P-5'-P-CCNC: 13 U/L (ref 13–39)
BASOPHILS # BLD AUTO: 0 10E3/UL (ref 0–0.2)
BASOPHILS NFR BLD AUTO: 0 %
BILIRUB SERPL-MCNC: 0.6 MG/DL (ref 0.3–1)
BUN SERPL-MCNC: 14 MG/DL (ref 7–25)
CALCIUM SERPL-MCNC: 9.9 MG/DL (ref 8.6–10.3)
CHLORIDE BLD-SCNC: 96 MMOL/L (ref 98–107)
CHOLEST SERPL-MCNC: 179 MG/DL
CO2 SERPL-SCNC: 34 MMOL/L (ref 21–31)
CREAT SERPL-MCNC: 0.89 MG/DL (ref 0.6–1.2)
EOSINOPHIL # BLD AUTO: 0.1 10E3/UL (ref 0–0.7)
EOSINOPHIL NFR BLD AUTO: 1 %
ERYTHROCYTE [DISTWIDTH] IN BLOOD BY AUTOMATED COUNT: 13.3 % (ref 10–15)
FASTING STATUS PATIENT QL REPORTED: NORMAL
GFR SERPL CREATININE-BSD FRML MDRD: 64 ML/MIN/1.73M2
GLUCOSE BLD-MCNC: 109 MG/DL (ref 70–105)
HCT VFR BLD AUTO: 44.4 % (ref 35–47)
HDLC SERPL-MCNC: 76 MG/DL (ref 23–92)
HGB BLD-MCNC: 14.3 G/DL (ref 11.7–15.7)
IMM GRANULOCYTES # BLD: 0 10E3/UL
IMM GRANULOCYTES NFR BLD: 0 %
LDLC SERPL CALC-MCNC: 86 MG/DL
LYMPHOCYTES # BLD AUTO: 1.6 10E3/UL (ref 0.8–5.3)
LYMPHOCYTES NFR BLD AUTO: 18 %
MCH RBC QN AUTO: 31.8 PG (ref 26.5–33)
MCHC RBC AUTO-ENTMCNC: 32.2 G/DL (ref 31.5–36.5)
MCV RBC AUTO: 99 FL (ref 78–100)
MONOCYTES # BLD AUTO: 0.6 10E3/UL (ref 0–1.3)
MONOCYTES NFR BLD AUTO: 7 %
NEUTROPHILS # BLD AUTO: 6.8 10E3/UL (ref 1.6–8.3)
NEUTROPHILS NFR BLD AUTO: 74 %
NONHDLC SERPL-MCNC: 103 MG/DL
NRBC # BLD AUTO: 0 10E3/UL
NRBC BLD AUTO-RTO: 0 /100
PLATELET # BLD AUTO: 252 10E3/UL (ref 150–450)
POTASSIUM BLD-SCNC: 3.8 MMOL/L (ref 3.5–5.1)
PROT SERPL-MCNC: 6.9 G/DL (ref 6.4–8.9)
RBC # BLD AUTO: 4.5 10E6/UL (ref 3.8–5.2)
SODIUM SERPL-SCNC: 137 MMOL/L (ref 134–144)
TRIGL SERPL-MCNC: 83 MG/DL
WBC # BLD AUTO: 9.2 10E3/UL (ref 4–11)

## 2022-09-02 PROCEDURE — G0439 PPPS, SUBSEQ VISIT: HCPCS | Performed by: FAMILY MEDICINE

## 2022-09-02 PROCEDURE — 80053 COMPREHEN METABOLIC PANEL: CPT | Mod: ZL | Performed by: FAMILY MEDICINE

## 2022-09-02 PROCEDURE — 85025 COMPLETE CBC W/AUTO DIFF WBC: CPT | Mod: ZL | Performed by: FAMILY MEDICINE

## 2022-09-02 PROCEDURE — 36415 COLL VENOUS BLD VENIPUNCTURE: CPT | Mod: ZL | Performed by: FAMILY MEDICINE

## 2022-09-02 PROCEDURE — 80061 LIPID PANEL: CPT | Mod: ZL | Performed by: FAMILY MEDICINE

## 2022-09-02 PROCEDURE — 82040 ASSAY OF SERUM ALBUMIN: CPT | Mod: ZL | Performed by: FAMILY MEDICINE

## 2022-09-02 RX ORDER — ATORVASTATIN CALCIUM 20 MG/1
10 TABLET, FILM COATED ORAL DAILY
Qty: 45 TABLET | Refills: 3 | Status: SHIPPED | OUTPATIENT
Start: 2022-09-02 | End: 2023-08-28

## 2022-09-02 RX ORDER — VALSARTAN AND HYDROCHLOROTHIAZIDE 160; 25 MG/1; MG/1
1 TABLET ORAL DAILY
Qty: 90 TABLET | Refills: 3 | Status: SHIPPED | OUTPATIENT
Start: 2022-09-02 | End: 2023-07-27

## 2022-09-02 ASSESSMENT — ENCOUNTER SYMPTOMS
HEMATURIA: 0
CHILLS: 0
ABDOMINAL PAIN: 0
HEMATOCHEZIA: 0

## 2022-09-02 ASSESSMENT — ACTIVITIES OF DAILY LIVING (ADL): CURRENT_FUNCTION: NO ASSISTANCE NEEDED

## 2022-09-02 ASSESSMENT — PAIN SCALES - GENERAL: PAINLEVEL: NO PAIN (0)

## 2022-09-02 NOTE — PATIENT INSTRUCTIONS
Patient Education   Personalized Prevention Plan  You are due for the preventive services outlined below.  Your care team is available to assist you in scheduling these services.  If you have already completed any of these items, please share that information with your care team to update in your medical record.  Health Maintenance Due   Topic Date Due     Discuss Advance Care Planning  Never done     Diptheria Tetanus Pertussis (DTAP/TDAP/TD) Vaccine (2 - Td or Tdap) 10/04/2021     Flu Vaccine (1) 09/01/2022       Exercise for a Healthier Heart  You may wonder how you can improve the health of your heart. If you re thinking about exercise, you re on the right track. You don t need to become an athlete. But you do need a certain amount of brisk exercise to help strengthen your heart. If you have been diagnosed with a heart condition, your healthcare provider may advise exercise to help stabilize your condition. To help make exercise a habit, choose safe, fun activities.      Exercise with a friend. When activity is fun, you're more likely to stick with it.   Before you start  Check with your healthcare provider before starting an exercise program. This is especially important if you have not been active for a while. It's also important if you have a long-term (chronic) health problem such as heart disease, diabetes, or obesity. Or if you are at high risk for having these problems.   Why exercise?  Exercising regularly offers many healthy rewards. It can help you do all of the following:     Improve your blood cholesterol level to help prevent further heart trouble    Lower your blood pressure to help prevent a stroke or heart attack    Control diabetes, or reduce your risk of getting this disease    Improve your heart and lung function    Reach and stay at a healthy weight    Make your muscles stronger so you can stay active    Prevent falls and fractures by slowing the loss of bone mass (osteoporosis)    Manage  stress better    Reduce your blood pressure    Improve your sense of self and your body image  Exercise tips      Ease into your routine. Set small goals. Then build on them. If you are not sure what your activity level should be, talk with your healthcare provider first before starting an exercise routine.    Exercise on most days. Aim for a total of 150 minutes (2 hours and 30 minutes) or more of moderate-intensity aerobic activity each week. Or 75 minutes (1 hour and 15 minutes) or more of vigorous-intensity aerobic activity each week. Or try for a combination of both. Moderate activity means that you breathe heavier and your heart rate increases but you can still talk. Think about doing 40 minutes of moderate exercise, 3 to 4 times a week. For best results, activity should last for about 40 minutes to lower blood pressure and cholesterol. It's OK to work up to the 40-minute period over time. Examples of moderate-intensity activity are walking 1 mile in 15 minutes. Or doing 30 to 45 minutes of yard work.    Step up your daily activity level.  Along with your exercise program, try being more active the whole day. Walk instead of drive. Or park further away so that you take more steps each day. Do more household tasks or yard work. You may not be able to meet the advised mount of physical activity. But doing some moderate- or vigorous-intensity aerobic activity can help reduce your risk for heart disease. Your healthcare provider can help you figure out what is best for you.    Choose 1 or more activities you enjoy.  Walking is one of the easiest things you can do. You can also try swimming, riding a bike, dancing, or taking an exercise class.    When to call your healthcare provider  Call your healthcare provider if you have any of these:     Chest pain or feel dizzy or lightheaded    Burning, tightness, pressure, or heaviness in your chest, neck, shoulders, back, or arms    Abnormal shortness of breath    More  joint or muscle pain    A very fast or irregular heartbeat (palpitations)  Avis last reviewed this educational content on 7/1/2019 2000-2021 [employer]. All rights reserved. This information is not intended as a substitute for professional medical care. Always follow your healthcare professional's instructions.

## 2022-09-02 NOTE — PROGRESS NOTES
"SUBJECTIVE:   Lelo Ríos is a 82 year old female who presents for Preventive Visit.    She has a history of hyperlipidemia, hypertension and recurrent pulmonary embolism.  She is due for refills.  She otherwise has no new concerns today.    Are you in the first 12 months of your Medicare coverage?  No    Healthy Habits:     In general, how would you rate your overall health?  Good    Frequency of exercise:  None    Do you usually eat at least 4 servings of fruit and vegetables a day, include whole grains    & fiber and avoid regularly eating high fat or \"junk\" foods?  Yes    Taking medications regularly:  Yes    Medication side effects:  None    Ability to successfully perform activities of daily living:  No assistance needed    Home Safety:  No safety concerns identified    Hearing Impairment:  No hearing concerns    In the past 6 months, have you been bothered by leaking of urine?  No    In general, how would you rate your overall mental or emotional health?  Excellent      PHQ-2 Total Score: 0    Additional concerns today:  No    Do you feel safe in your environment? Yes    Have you ever done Advance Care Planning? (For example, a Health Directive, POLST, or a discussion with a medical provider or your loved ones about your wishes): No, advance care planning information given to patient to review.  Patient declined advance care planning discussion at this time.    Fall risk  Fallen 2 or more times in the past year?: No  Any fall with injury in the past year?: No    Cognitive Screening   1) Repeat 3 items (Leader, Season, Table)    2) Clock draw: NORMAL  3) 3 item recall: Recalls 2 objects   Results: NORMAL clock, 1-2 items recalled: COGNITIVE IMPAIRMENT LESS LIKELY    Mini-CogTM Copyright [employer]. Licensed by the author for use in [facility]; reprinted with permission (fide@.[facility]). All rights reserved.      Do you have sleep apnea, excessive snoring or daytime drowsiness?: no    Reviewed and " "updated as needed this visit by clinical staff   Tobacco  Allergies  Meds   Med Hx  Surg Hx  Fam Hx  Soc Hx          Reviewed and updated as needed this visit by Provider                   Social History     Tobacco Use     Smoking status: Former Smoker     Smokeless tobacco: Never Used     Tobacco comment: Quit smoking: previously a social smoker   Substance Use Topics     Alcohol use: Not Currently     Comment: Alcoholic Drinks/day: occassionally         Alcohol Use 9/2/2022   Prescreen: >3 drinks/day or >7 drinks/week? Not Applicable       Current providers sharing in care for this patient include:   Patient Care Team:  Robert Herrera as PCP - General (Family Practice)  Tereza Brito PA-C as Assigned PCP    The following health maintenance items are reviewed in Epic and correct as of today:  Health Maintenance Due   Topic Date Due     ADVANCE CARE PLANNING  Never done     MEDICARE ANNUAL WELLNESS VISIT  Never done     DTAP/TDAP/TD IMMUNIZATION (2 - Td or Tdap) 10/04/2021     INFLUENZA VACCINE (1) 09/01/2022     Lab work is in process    Pertinent mammograms are reviewed under the imaging tab.    Review of Systems   Constitutional: Negative for chills.   HENT: Negative for congestion.    Cardiovascular: Negative for chest pain.   Gastrointestinal: Negative for abdominal pain and hematochezia.   Genitourinary: Negative for hematuria.         OBJECTIVE:   BP (!) 146/94   Pulse 93   Temp 98.3  F (36.8  C) (Tympanic)   Resp 18   Ht 1.67 m (5' 5.75\")   Wt 83.3 kg (183 lb 9.6 oz)   SpO2 94%   Breastfeeding No   BMI 29.86 kg/m   Estimated body mass index is 29.86 kg/m  as calculated from the following:    Height as of this encounter: 1.67 m (5' 5.75\").    Weight as of this encounter: 83.3 kg (183 lb 9.6 oz).  Physical Exam  GENERAL: healthy, alert and no distress  EYES: Eyes grossly normal to inspection, PERRL and conjunctivae and sclerae normal  HENT: ear canals and TM's normal, nose and mouth without " "ulcers or lesions  NECK: no adenopathy, no asymmetry, masses, or scars and thyroid normal to palpation  RESP: lungs clear to auscultation - no rales, rhonchi or wheezes  CV: regular rate and rhythm, normal S1 S2, no S3 or S4, no murmur, click or rub, no peripheral edema and peripheral pulses strong  MS: no gross musculoskeletal defects noted, no edema  SKIN: no suspicious lesions or rashes  NEURO: Normal strength and tone, mentation intact and speech normal  PSYCH: mentation appears normal, affect normal/bright    Diagnostic Test Results:  none     ASSESSMENT / PLAN:       ICD-10-CM    1. Encounter for Medicare annual wellness exam  Z00.00    2. Pure hypercholesterolemia  E78.00 atorvastatin (LIPITOR) 20 MG tablet     Lipid Panel     Comprehensive Metabolic Panel     CBC with Platelets & Differential     CBC with Platelets & Differential     Comprehensive Metabolic Panel     Lipid Panel   3. Benign essential hypertension  I10 valsartan-hydrochlorothiazide (DIOVAN HCT) 160-25 MG tablet   4. Recurrent pulmonary embolism (H)  I26.99 rivaroxaban ANTICOAGULANT (XARELTO ANTICOAGULANT) 20 MG TABS tablet     We will get fasting labs today.    Medications refilled again for another year.    She is up-to-date on immunizations.    Patient has been advised of split billing requirements and indicates understanding: Yes    COUNSELING:  Reviewed preventive health counseling, as reflected in patient instructions       Vision screening       Hearing screening       Dental care    Estimated body mass index is 29.86 kg/m  as calculated from the following:    Height as of this encounter: 1.67 m (5' 5.75\").    Weight as of this encounter: 83.3 kg (183 lb 9.6 oz).    Weight management plan: Discussed healthy diet and exercise guidelines    She reports that she has quit smoking. She has never used smokeless tobacco.      Appropriate preventive services were discussed with this patient, including applicable screening as appropriate for " cardiovascular disease, diabetes, osteopenia/osteoporosis, and glaucoma.  As appropriate for age/gender, discussed screening for colorectal cancer, prostate cancer, breast cancer, and cervical cancer. Checklist reviewing preventive services available has been given to the patient.    Reviewed patients plan of care and provided an AVS. The Basic Care Plan (routine screening as documented in Health Maintenance) for Lelo meets the Care Plan requirement. This Care Plan has been established and reviewed with the Patient.    Counseling Resources:  ATP IV Guidelines  Pooled Cohorts Equation Calculator  Breast Cancer Risk Calculator  Breast Cancer: Medication to Reduce Risk  FRAX Risk Assessment  ICSI Preventive Guidelines  Dietary Guidelines for Americans, 2010  [employer]'s MyPlate  ASA Prophylaxis  Lung CA Screening    Robert Herrera  [facility]  Review current opioid prescriptions    For a patient with a current opioid prescription:    Reviewed potential Opioid Use Disorder (OUD) risk factors: Yes     Evaluate their pain severity and current treatment plan:     Provide information on non-opioid treatment options:    Refer to a specialist, as appropriate:    Get more information on pain management in the [facility] Pain Management Best Practices Inter-agency Task Force Report    Screen for potential Substance Use Disorders (SUDs)    Reviewed the patient s potential risk factors for SUDs: Yes     Refer to treatment or specialist, as appropriate:     A screening tool isn t required but you may use one:  Find more information in the National [place] on Drug Abuse Screening and Assessment Tools Chart    Identified Health Risks:

## 2022-09-02 NOTE — LETTER
September 2, 2022      Lelo Ríos  [address]  formerly [facility] [id number]        Dear ,    We are writing to inform you of your test results.    Your cholesterol panel, diabetes screening a fasting glucose, liver and kidney testing all came back normal.  Your blood counts also came back normal.  Overall this is reassuring and should be checked again in 1 year.    Resulted Orders   Comprehensive Metabolic Panel   Result Value Ref Range    Sodium 137 134 - 144 mmol/L    Potassium 3.8 3.5 - 5.1 mmol/L    Chloride 96 (L) 98 - 107 mmol/L    Carbon Dioxide (CO2) 34 (H) 21 - 31 mmol/L    Anion Gap 7 3 - 14 mmol/L    Urea Nitrogen 14 7 - 25 mg/dL    Creatinine 0.89 0.60 - 1.20 mg/dL    Calcium 9.9 8.6 - 10.3 mg/dL    Glucose 109 (H) 70 - 105 mg/dL    Alkaline Phosphatase 70 34 - 104 U/L    AST 13 13 - 39 U/L    ALT 9 7 - 52 U/L    Protein Total 6.9 6.4 - 8.9 g/dL    Albumin 3.7 3.5 - 5.7 g/dL    Bilirubin Total 0.6 0.3 - 1.0 mg/dL    GFR Estimate 64 >60 mL/min/1.73m2      Comment:      Effective December 21, 2021 eGFRcr in adults is calculated using the 2021 CKD-EPI creatinine equation which includes age and gender (Adam canseco al., NEJM, DOI: 10.1056/WZPFlg2808110)   Lipid Panel   Result Value Ref Range    Cholesterol 179 <200 mg/dL    Triglycerides 83 <150 mg/dL    Direct Measure HDL 76 23 - 92 mg/dL    LDL Cholesterol Calculated 86 <=100 mg/dL    Non HDL Cholesterol 103 <130 mg/dL    Patient Fasting > 8hrs? Unknown     Narrative    Cholesterol  Desirable:  <200 mg/dL    Triglycerides  Normal:  Less than 150 mg/dL  Borderline High:  150-199 mg/dL  High:  200-499 mg/dL  Very High:  Greater than or equal to 500 mg/dL    Direct Measure HDL  Female:  Greater than or equal to 50 mg/dL   Male:  Greater than or equal to 40 mg/dL    LDL Cholesterol  Desirable:  <100mg/dL  Above Desirable:  100-129 mg/dL   Borderline High:  130-159 mg/dL   High:  160-189 mg/dL   Very High:  >= 190 mg/dL    Non HDL  Cholesterol  Desirable:  130 mg/dL  Above Desirable:  130-159 mg/dL  Borderline High:  160-189 mg/dL  High:  190-219 mg/dL  Very High:  Greater than or equal to 220 mg/dL   CBC with platelets and differential   Result Value Ref Range    WBC Count 9.2 4.0 - 11.0 10e3/uL    RBC Count 4.50 3.80 - 5.20 10e6/uL    Hemoglobin 14.3 11.7 - 15.7 g/dL    Hematocrit 44.4 35.0 - 47.0 %    MCV 99 78 - 100 fL    MCH 31.8 26.5 - 33.0 pg    MCHC 32.2 31.5 - 36.5 g/dL    RDW 13.3 10.0 - 15.0 %    Platelet Count 252 150 - 450 10e3/uL    % Neutrophils 74 %    % Lymphocytes 18 %    % Monocytes 7 %    % Eosinophils 1 %    % Basophils 0 %    % Immature Granulocytes 0 %    NRBCs per 100 WBC 0 <1 /100    Absolute Neutrophils 6.8 1.6 - 8.3 10e3/uL    Absolute Lymphocytes 1.6 0.8 - 5.3 10e3/uL    Absolute Monocytes 0.6 0.0 - 1.3 10e3/uL    Absolute Eosinophils 0.1 0.0 - 0.7 10e3/uL    Absolute Basophils 0.0 0.0 - 0.2 10e3/uL    Absolute Immature Granulocytes 0.0 <=0.4 10e3/uL    Absolute NRBCs 0.0 10e3/uL       If you have any questions or concerns, please call the clinic at the number listed above.       Sincerely,      Robert Herrera

## 2022-09-02 NOTE — NURSING NOTE
Patient is here for medicare wellness.     No LMP recorded. Patient is postmenopausal.  Medication Reconciliation: complete    Francia Mason LPN 9/2/2022 2:06 PM       Advance care directive on file? no  Advance care directive provided to patient? yes       Francia Mason LPN

## 2023-07-03 ENCOUNTER — OFFICE VISIT (OUTPATIENT)
Dept: FAMILY MEDICINE | Facility: OTHER | Age: 83
End: 2023-07-03
Attending: FAMILY MEDICINE
Payer: MEDICARE

## 2023-07-03 ENCOUNTER — HOSPITAL ENCOUNTER (OUTPATIENT)
Dept: GENERAL RADIOLOGY | Facility: OTHER | Age: 83
Discharge: HOME | End: 2023-07-03
Attending: FAMILY MEDICINE
Payer: MEDICARE

## 2023-07-03 ENCOUNTER — TELEPHONE (OUTPATIENT)
Dept: FAMILY MEDICINE | Facility: OTHER | Age: 83
End: 2023-07-03
Payer: MEDICARE

## 2023-07-03 VITALS
DIASTOLIC BLOOD PRESSURE: 94 MMHG | HEART RATE: 86 BPM | SYSTOLIC BLOOD PRESSURE: 150 MMHG | RESPIRATION RATE: 28 BRPM | HEIGHT: 66 IN | WEIGHT: 186.5 LBS | BODY MASS INDEX: 29.97 KG/M2 | TEMPERATURE: 97.1 F | OXYGEN SATURATION: 93 %

## 2023-07-03 DIAGNOSIS — I26.99 RECURRENT PULMONARY EMBOLISM (H): ICD-10-CM

## 2023-07-03 DIAGNOSIS — R06.09 DYSPNEA ON EXERTION: Primary | ICD-10-CM

## 2023-07-03 DIAGNOSIS — R06.09 DYSPNEA ON EXERTION: ICD-10-CM

## 2023-07-03 DIAGNOSIS — I10 PRIMARY HYPERTENSION: ICD-10-CM

## 2023-07-03 LAB
ALBUMIN SERPL BCG-MCNC: 3.2 G/DL (ref 3.5–5.2)
ALP SERPL-CCNC: 70 U/L (ref 35–104)
ALT SERPL W P-5'-P-CCNC: 14 U/L (ref 0–50)
ANION GAP SERPL CALCULATED.3IONS-SCNC: 9 MMOL/L (ref 7–15)
AST SERPL W P-5'-P-CCNC: 17 U/L (ref 0–45)
ATRIAL RATE - MUSE: 86 BPM
BASOPHILS # BLD AUTO: 0.1 10E3/UL (ref 0–0.2)
BASOPHILS NFR BLD AUTO: 1 %
BILIRUB SERPL-MCNC: 0.8 MG/DL
BUN SERPL-MCNC: 18.1 MG/DL (ref 8–23)
CALCIUM SERPL-MCNC: 9.5 MG/DL (ref 8.8–10.2)
CHLORIDE SERPL-SCNC: 94 MMOL/L (ref 98–107)
CREAT SERPL-MCNC: 0.98 MG/DL (ref 0.51–0.95)
DEPRECATED HCO3 PLAS-SCNC: 34 MMOL/L (ref 22–29)
DIASTOLIC BLOOD PRESSURE - MUSE: NORMAL MMHG
EOSINOPHIL # BLD AUTO: 0 10E3/UL (ref 0–0.7)
EOSINOPHIL NFR BLD AUTO: 1 %
ERYTHROCYTE [DISTWIDTH] IN BLOOD BY AUTOMATED COUNT: 14 % (ref 10–15)
GFR SERPL CREATININE-BSD FRML MDRD: 57 ML/MIN/1.73M2
GLUCOSE SERPL-MCNC: 117 MG/DL (ref 70–99)
HCT VFR BLD AUTO: 48 % (ref 35–47)
HGB BLD-MCNC: 14.8 G/DL (ref 11.7–15.7)
IMM GRANULOCYTES # BLD: 0 10E3/UL
IMM GRANULOCYTES NFR BLD: 0 %
INTERPRETATION ECG - MUSE: NORMAL
LYMPHOCYTES # BLD AUTO: 1.3 10E3/UL (ref 0.8–5.3)
LYMPHOCYTES NFR BLD AUTO: 18 %
MCH RBC QN AUTO: 29.1 PG (ref 26.5–33)
MCHC RBC AUTO-ENTMCNC: 30.8 G/DL (ref 31.5–36.5)
MCV RBC AUTO: 94 FL (ref 78–100)
MONOCYTES # BLD AUTO: 0.6 10E3/UL (ref 0–1.3)
MONOCYTES NFR BLD AUTO: 9 %
NEUTROPHILS # BLD AUTO: 5.2 10E3/UL (ref 1.6–8.3)
NEUTROPHILS NFR BLD AUTO: 71 %
NRBC # BLD AUTO: 0 10E3/UL
NRBC BLD AUTO-RTO: 0 /100
NT-PROBNP SERPL-MCNC: 7267 PG/ML (ref 0–1800)
P AXIS - MUSE: 60 DEGREES
PLATELET # BLD AUTO: 285 10E3/UL (ref 150–450)
POTASSIUM SERPL-SCNC: 3.8 MMOL/L (ref 3.4–5.3)
PR INTERVAL - MUSE: 154 MS
PROT SERPL-MCNC: 6.4 G/DL (ref 6.4–8.3)
QRS DURATION - MUSE: 74 MS
QT - MUSE: 360 MS
QTC - MUSE: 430 MS
R AXIS - MUSE: 11 DEGREES
RBC # BLD AUTO: 5.09 10E6/UL (ref 3.8–5.2)
SODIUM SERPL-SCNC: 137 MMOL/L (ref 136–145)
SYSTOLIC BLOOD PRESSURE - MUSE: NORMAL MMHG
T AXIS - MUSE: -15 DEGREES
TROPONIN T SERPL HS-MCNC: 32 NG/L
VENTRICULAR RATE- MUSE: 86 BPM
WBC # BLD AUTO: 7.3 10E3/UL (ref 4–11)

## 2023-07-03 PROCEDURE — 71046 X-RAY EXAM CHEST 2 VIEWS: CPT

## 2023-07-03 PROCEDURE — G0463 HOSPITAL OUTPT CLINIC VISIT: HCPCS

## 2023-07-03 PROCEDURE — 93010 ELECTROCARDIOGRAM REPORT: CPT | Performed by: INTERNAL MEDICINE

## 2023-07-03 PROCEDURE — 85025 COMPLETE CBC W/AUTO DIFF WBC: CPT | Mod: ZL | Performed by: FAMILY MEDICINE

## 2023-07-03 PROCEDURE — 80053 COMPREHEN METABOLIC PANEL: CPT | Mod: ZL | Performed by: FAMILY MEDICINE

## 2023-07-03 PROCEDURE — 93005 ELECTROCARDIOGRAM TRACING: CPT | Performed by: FAMILY MEDICINE

## 2023-07-03 PROCEDURE — 84484 ASSAY OF TROPONIN QUANT: CPT | Mod: ZL | Performed by: FAMILY MEDICINE

## 2023-07-03 PROCEDURE — 99215 OFFICE O/P EST HI 40 MIN: CPT | Performed by: FAMILY MEDICINE

## 2023-07-03 PROCEDURE — 36415 COLL VENOUS BLD VENIPUNCTURE: CPT | Mod: ZL | Performed by: FAMILY MEDICINE

## 2023-07-03 PROCEDURE — 83880 ASSAY OF NATRIURETIC PEPTIDE: CPT | Mod: ZL | Performed by: FAMILY MEDICINE

## 2023-07-03 RX ORDER — FUROSEMIDE 20 MG
20 TABLET ORAL 2 TIMES DAILY
Qty: 60 TABLET | Refills: 3 | Status: SHIPPED | OUTPATIENT
Start: 2023-07-03 | End: 2023-08-28

## 2023-07-03 ASSESSMENT — PAIN SCALES - GENERAL: PAINLEVEL: NO PAIN (0)

## 2023-07-03 ASSESSMENT — ENCOUNTER SYMPTOMS: SHORTNESS OF BREATH: 1

## 2023-07-03 NOTE — NURSING NOTE
Patient presents to clinic experiencing shortness of breath, bilateral lower legs pitting edema 3+, blue fingers and hypertension.    Medication Rec Complete  Radha Nieves LPN............7/3/2023 12:56 PM

## 2023-07-03 NOTE — TELEPHONE ENCOUNTER
Reason for call: Patient wanting a work in appointment.    Is the appointment for a Hospital Follow up?  No     (If yes - Unable to find an appointment with any provider during the time frame needed. Nurse/Provider - Can this patient be worked into a schedule with PCP or team member?)    Patient is having the following symptoms:  Ongoing breathing issues     The patient is requesting an appointment with  [facility]    Was an appointment offered for this call? Yes    If Yes, what is the date of the appointment?  8/21     Preferred method for responding to this message: Telephone Call    Phone number patient can be reached at? Home number on file [phone] (home)    If we can't reach you directly, may we leave a detailed response at the number you provided? Yes    Can this message wait until your PCP/provider returns if unavailable today? No

## 2023-07-03 NOTE — PROGRESS NOTES
"  Assessment & Plan     Dyspnea on exertion  Chest x-ray performed, personally viewed and shows cardiomegaly, no infiltrate.  EKG performed and personally reviewed showing sinus rhythm without any ischemic changes.  Significantly elevated BNP.  Troponin is slightly elevated likely due to demand and unlikely due to ischemia.  At this time we will start Lasix 10 mg twice daily and avoid salt.  She will follow-up in 2 weeks for reassessment.  Also refer for echocardiogram.    We will continue to monitor her blood pressure.    - XR Chest 2 Views; Future  - Brain Natriuretic Peptide (BNP); Future  - Troponin T, High Sensitivity; Future  - Comprehensive Metabolic Panel; Future  - CBC and Differential; Future  - EKG 12-lead, tracing only  - Brain Natriuretic Peptide (BNP)  - Troponin T, High Sensitivity  - Comprehensive Metabolic Panel  - CBC and Differential    Recurrent pulmonary embolism (H)  Xarelto lifelong               BMI:   Estimated body mass index is 30.33 kg/m  as calculated from the following:    Height as of this encounter: 1.67 m (5' 5.75\").    Weight as of this encounter: 84.6 kg (186 lb 8 oz).   Weight management plan: Discussed healthy diet and exercise guidelines        No follow-ups on file.    Robert Herrera MD  [facility]   Lelo is a 83 year old, presenting for the following health issues:  Shortness of Breath (Increasing with activity, bilat lower edema 3+, blue fingers)    She comes in today with progressive shortness of breath, dyspnea on exertion.  She reports that she is not able to walk more than 100 feet without feeling short of breath.  She had no coughing, wheezing.  Her history includes recurrent pulmonary embolism and she is on lifelong Xarelto.  She had a normal stress test in August 2020.  She is developed lower extremity edema over the last month or 2 which is new for her.  She does not use additional salt.  Her appetite has been poor.    Shortness " "of Breath                 Review of Systems   Respiratory: Positive for shortness of breath.             Objective    BP (!) 150/94 (BP Location: Right arm, Patient Position: Sitting, Cuff Size: Adult Regular)   Pulse 86   Temp 97.1  F (36.2  C) (Tympanic)   Resp 28   Ht 1.67 m (5' 5.75\")   Wt 84.6 kg (186 lb 8 oz)   SpO2 93%   BMI 30.33 kg/m    Body mass index is 30.33 kg/m .  Physical Exam   GENERAL: healthy, alert and no distress  RESP: lungs clear to auscultation - no rales, rhonchi or wheezes  CV: regular rate and rhythm, normal S1 S2, no S3 or S4, no murmur, click or rub, no peripheral edema and peripheral pulses strong  MS: 2+ pitting edema to her mid shins bilaterally.  SKIN: no suspicious lesions or rashes    Results for orders placed or performed during the hospital encounter of 07/03/23   XR Chest 2 Views     Status: None    Narrative    PROCEDURE: XR CHEST 2 VIEWS 7/3/2023 1:26 PM    HISTORY: Dyspnea on exertion    COMPARISONS: 7/23/2020.    TECHNIQUE: 2 views.    FINDINGS: Heart is enlarged but is stable in size. Hilar prominence is  stable as well. No acute infiltrate is seen and there is no pleural  effusion.    Degenerative change is seen in the spine.         Impression    IMPRESSION: No acute disease.    JENNIFER ENGLAND MD         SYSTEM ID:  [id number]   Results for orders placed or performed in visit on 07/03/23   Brain Natriuretic Peptide (BNP)     Status: Abnormal   Result Value Ref Range    N Terminal Pro BNP Outpatient 7,267 (H) 0 - 1,800 pg/mL   Troponin T, High Sensitivity     Status: Abnormal   Result Value Ref Range    Troponin T, High Sensitivity 32 (H) <=14 ng/L   Comprehensive Metabolic Panel     Status: Abnormal   Result Value Ref Range    Sodium 137 136 - 145 mmol/L    Potassium 3.8 3.4 - 5.3 mmol/L    Chloride 94 (L) 98 - 107 mmol/L    Carbon Dioxide (CO2) 34 (H) 22 - 29 mmol/L    Anion Gap 9 7 - 15 mmol/L    Urea Nitrogen 18.1 8.0 - 23.0 mg/dL    Creatinine 0.98 (H) 0.51 - " 0.95 mg/dL    Calcium 9.5 8.8 - 10.2 mg/dL    Glucose 117 (H) 70 - 99 mg/dL    Alkaline Phosphatase 70 35 - 104 U/L    AST 17 0 - 45 U/L    ALT 14 0 - 50 U/L    Protein Total 6.4 6.4 - 8.3 g/dL    Albumin 3.2 (L) 3.5 - 5.2 g/dL    Bilirubin Total 0.8 <=1.2 mg/dL    GFR Estimate 57 (L) >60 mL/min/1.73m2   CBC with platelets and differential     Status: Abnormal   Result Value Ref Range    WBC Count 7.3 4.0 - 11.0 10e3/uL    RBC Count 5.09 3.80 - 5.20 10e6/uL    Hemoglobin 14.8 11.7 - 15.7 g/dL    Hematocrit 48.0 (H) 35.0 - 47.0 %    MCV 94 78 - 100 fL    MCH 29.1 26.5 - 33.0 pg    MCHC 30.8 (L) 31.5 - 36.5 g/dL    RDW 14.0 10.0 - 15.0 %    Platelet Count 285 150 - 450 10e3/uL    % Neutrophils 71 %    % Lymphocytes 18 %    % Monocytes 9 %    % Eosinophils 1 %    % Basophils 1 %    % Immature Granulocytes 0 %    NRBCs per 100 WBC 0 <1 /100    Absolute Neutrophils 5.2 1.6 - 8.3 10e3/uL    Absolute Lymphocytes 1.3 0.8 - 5.3 10e3/uL    Absolute Monocytes 0.6 0.0 - 1.3 10e3/uL    Absolute Eosinophils 0.0 0.0 - 0.7 10e3/uL    Absolute Basophils 0.1 0.0 - 0.2 10e3/uL    Absolute Immature Granulocytes 0.0 <=0.4 10e3/uL    Absolute NRBCs 0.0 10e3/uL   EKG 12-lead, tracing only     Status: None (Preliminary result)   Result Value Ref Range    Systolic Blood Pressure  mmHg    Diastolic Blood Pressure  mmHg    Ventricular Rate 86 BPM    Atrial Rate 86 BPM    NV Interval 154 ms    QRS Duration 74 ms     ms    QTc 430 ms    P Axis 60 degrees    R AXIS 11 degrees    T Axis -15 degrees    Interpretation ECG       Sinus rhythm  Nonspecific ST and T wave abnormality  Abnormal ECG  When compared with ECG of 28-JUL-2020 14:24,  Inverted T waves have replaced nonspecific T wave abnormality in Inferior leads  Nonspecific T wave abnormality now evident in Anterior leads     CBC and Differential     Status: Abnormal    Narrative    The following orders were created for panel order CBC and Differential.  Procedure                                Abnormality         Status                     ---------                               -----------         ------                     CBC with platelets and d...[[id number]]  Abnormal            Final result                 Please view results for these tests on the individual orders.

## 2023-07-14 ENCOUNTER — HOSPITAL ENCOUNTER (OUTPATIENT)
Dept: CARDIOLOGY | Facility: OTHER | Age: 83
Discharge: HOME | End: 2023-07-14
Attending: FAMILY MEDICINE | Admitting: FAMILY MEDICINE
Payer: MEDICARE

## 2023-07-14 DIAGNOSIS — R06.09 DYSPNEA ON EXERTION: ICD-10-CM

## 2023-07-14 LAB — LVEF ECHO: NORMAL

## 2023-07-14 PROCEDURE — 93306 TTE W/DOPPLER COMPLETE: CPT | Mod: 26 | Performed by: STUDENT IN AN ORGANIZED HEALTH CARE EDUCATION/TRAINING PROGRAM

## 2023-07-14 PROCEDURE — 93306 TTE W/DOPPLER COMPLETE: CPT

## 2023-07-17 ENCOUNTER — OFFICE VISIT (OUTPATIENT)
Dept: FAMILY MEDICINE | Facility: OTHER | Age: 83
End: 2023-07-17
Attending: FAMILY MEDICINE
Payer: MEDICARE

## 2023-07-17 VITALS
BODY MASS INDEX: 28.96 KG/M2 | TEMPERATURE: 96.5 F | RESPIRATION RATE: 22 BRPM | SYSTOLIC BLOOD PRESSURE: 116 MMHG | DIASTOLIC BLOOD PRESSURE: 80 MMHG | HEIGHT: 66 IN | OXYGEN SATURATION: 94 % | HEART RATE: 84 BPM | WEIGHT: 180.2 LBS

## 2023-07-17 DIAGNOSIS — I50.32 CHRONIC DIASTOLIC HEART FAILURE (H): Primary | ICD-10-CM

## 2023-07-17 DIAGNOSIS — E53.8 VITAMIN B12 DEFICIENCY (NON ANEMIC): ICD-10-CM

## 2023-07-17 DIAGNOSIS — N18.31 CHRONIC KIDNEY DISEASE, STAGE 3A (H): ICD-10-CM

## 2023-07-17 DIAGNOSIS — R53.83 FATIGUE, UNSPECIFIED TYPE: ICD-10-CM

## 2023-07-17 DIAGNOSIS — E03.9 HYPOTHYROIDISM, UNSPECIFIED TYPE: ICD-10-CM

## 2023-07-17 LAB
ALBUMIN SERPL BCG-MCNC: 3.5 G/DL (ref 3.5–5.2)
ALP SERPL-CCNC: 63 U/L (ref 35–104)
ALT SERPL W P-5'-P-CCNC: 11 U/L (ref 0–50)
ANION GAP SERPL CALCULATED.3IONS-SCNC: 8 MMOL/L (ref 7–15)
AST SERPL W P-5'-P-CCNC: 20 U/L (ref 0–45)
BILIRUB SERPL-MCNC: 1.3 MG/DL
BUN SERPL-MCNC: 32.2 MG/DL (ref 8–23)
CALCIUM SERPL-MCNC: 11.1 MG/DL (ref 8.8–10.2)
CHLORIDE SERPL-SCNC: 87 MMOL/L (ref 98–107)
CREAT SERPL-MCNC: 1.54 MG/DL (ref 0.51–0.95)
DEPRECATED HCO3 PLAS-SCNC: 43 MMOL/L (ref 22–29)
GFR SERPL CREATININE-BSD FRML MDRD: 33 ML/MIN/1.73M2
GLUCOSE SERPL-MCNC: 120 MG/DL (ref 70–99)
HOLD SPECIMEN: NORMAL
HOLD SPECIMEN: NORMAL
NT-PROBNP SERPL-MCNC: 3936 PG/ML (ref 0–1800)
POTASSIUM SERPL-SCNC: 3.2 MMOL/L (ref 3.4–5.3)
PROT SERPL-MCNC: 6.3 G/DL (ref 6.4–8.3)
SODIUM SERPL-SCNC: 138 MMOL/L (ref 136–145)
T4 FREE SERPL-MCNC: 1.41 NG/DL (ref 0.9–1.7)
TSH SERPL DL<=0.005 MIU/L-ACNC: 7.21 UIU/ML (ref 0.3–4.2)
VIT B12 SERPL-MCNC: 228 PG/ML (ref 232–1245)

## 2023-07-17 PROCEDURE — G0463 HOSPITAL OUTPT CLINIC VISIT: HCPCS

## 2023-07-17 PROCEDURE — 36415 COLL VENOUS BLD VENIPUNCTURE: CPT | Mod: ZL | Performed by: FAMILY MEDICINE

## 2023-07-17 PROCEDURE — 80053 COMPREHEN METABOLIC PANEL: CPT | Mod: ZL | Performed by: FAMILY MEDICINE

## 2023-07-17 PROCEDURE — 84439 ASSAY OF FREE THYROXINE: CPT | Mod: ZL | Performed by: FAMILY MEDICINE

## 2023-07-17 PROCEDURE — 84443 ASSAY THYROID STIM HORMONE: CPT | Mod: ZL | Performed by: FAMILY MEDICINE

## 2023-07-17 PROCEDURE — 82607 VITAMIN B-12: CPT | Mod: ZL | Performed by: FAMILY MEDICINE

## 2023-07-17 PROCEDURE — 83880 ASSAY OF NATRIURETIC PEPTIDE: CPT | Mod: ZL | Performed by: FAMILY MEDICINE

## 2023-07-17 PROCEDURE — 99214 OFFICE O/P EST MOD 30 MIN: CPT | Performed by: FAMILY MEDICINE

## 2023-07-17 RX ORDER — LEVOTHYROXINE SODIUM 50 UG/1
50 TABLET ORAL DAILY
Qty: 30 TABLET | Refills: 3 | Status: SHIPPED | OUTPATIENT
Start: 2023-07-17 | End: 2023-08-28

## 2023-07-17 RX ORDER — LANOLIN ALCOHOL/MO/W.PET/CERES
1000 CREAM (GRAM) TOPICAL DAILY
Qty: 30 TABLET | Refills: 3 | Status: SHIPPED | OUTPATIENT
Start: 2023-07-17 | End: 2023-11-06

## 2023-07-17 ASSESSMENT — PAIN SCALES - GENERAL: PAINLEVEL: NO PAIN (0)

## 2023-07-17 NOTE — PROGRESS NOTES
Assessment & Plan     Chronic diastolic heart failure (H)  Reviewed echocardiogram which showed preserved ejection fraction.  We will repeat BNP and I suspect this will be significant proved when compared to previous.  We will also update kidney function.  Will refer to cardiology for further considerations given her continued symptoms.  - Brain Natriuretic Peptide (BNP); Future  - Comprehensive Metabolic Panel; Future  - Adult Cardiology Eval  Referral; Future    Chronic kidney disease, stage 3a (H)  Repeat kidney function  - Vitamin B12; Future    Fatigue, unspecified type  For her fatigue we will also check TSH and B12.  She had normal hemoglobin in the past.  I suspect most of her fatigue is likely due to deconditioning, potentially secondary to her heart failure.  - TSH Reflex GH; Future    Addendum  Her TSH came back elevated, will start Synthroid 50 mcg daily and repeat again in 6 to 8 weeks.    Her B12 came back low so we will also start B12 1000 mg daily.    BNP is improved but kidney function is up.  Encouraged increasing fluids.  Robert Herrera MD on 7/17/2023 at 12:51 PM              No follow-ups on file.    Robert Herrera MD  [facility]    Della Lind is a 83 year old, presenting for the following health issues:  RECHECK (Follow-up SOB )    She was seen 2 weeks ago for worsening shortness of breath.  She was found at that time to have significantly elevated BNP.  She is urgent Lasix 20 mg twice daily.  She has lost 6 pounds since her last visit, presumably mostly water weight.  Her blood pressure is also improved significantly.  She reports that her breathing has somewhat improved however she feels quite fatigued still.  She is frustrated at her lack of improvement.  She reports that she has been urinating more than normal.        7/17/2023    10:14 AM   Additional Questions   Roomed by Ange Gonzales     History of Present Illness       Reason for visit:   "Breathing    She eats 0-1 servings of fruits and vegetables daily.She consumes 0 sweetened beverage(s) daily.She exercises with enough effort to increase her heart rate 9 or less minutes per day.  She exercises with enough effort to increase her heart rate 3 or less days per week.   She is taking medications regularly.       Follow-up breathing         Review of Systems         Objective    Pulse 84   Temp (!) 96.5  F (35.8  C) (Temporal)   Resp 22   Ht 1.67 m (5' 5.75\")   Wt 81.7 kg (180 lb 3.2 oz)   Breastfeeding No   BMI 29.31 kg/m    Body mass index is 29.31 kg/m .  Physical Exam   GENERAL: healthy, alert and no distress  RESP: lungs clear to auscultation - no rales, rhonchi or wheezes.  She is able to speak in complete sentences which she was unable to do 2 weeks ago.  CV: regular rate and rhythm, normal S1 S2, no S3 or S4, no murmur, click or rub, no peripheral edema and peripheral pulses strong  Extremities: Trace to 1+ pedal edema to her mid shins bilaterally, improved from previous.                    "

## 2023-07-17 NOTE — NURSING NOTE
Chief Complaint   Patient presents with     RECHECK     Follow-up SOB          Medication Reconciliation: complete    Ange Gonzales, LPN

## 2023-07-25 DIAGNOSIS — R06.09 DYSPNEA ON EXERTION: ICD-10-CM

## 2023-07-25 RX ORDER — FUROSEMIDE 20 MG
TABLET ORAL
Qty: 60 TABLET | Refills: 3 | OUTPATIENT
Start: 2023-07-25

## 2023-07-25 NOTE — TELEPHONE ENCOUNTER
FUROSEMIDE 20 MG TABLET   Call placed to pharmacy with verification that refills are available. Unable to complete prescription refill per RNMedication Refill Policy.................... Yesenia Guerra RN ....................  7/25/2023   3:45 PM

## 2023-07-27 DIAGNOSIS — I26.99 RECURRENT PULMONARY EMBOLISM (H): ICD-10-CM

## 2023-07-27 DIAGNOSIS — I10 BENIGN ESSENTIAL HYPERTENSION: ICD-10-CM

## 2023-07-27 RX ORDER — VALSARTAN AND HYDROCHLOROTHIAZIDE 160; 25 MG/1; MG/1
TABLET ORAL
Qty: 90 TABLET | Refills: 3 | Status: SHIPPED | OUTPATIENT
Start: 2023-07-27 | End: 2023-09-14 | Stop reason: ALTCHOICE

## 2023-07-27 RX ORDER — RIVAROXABAN 20 MG/1
TABLET, FILM COATED ORAL
Qty: 90 TABLET | Refills: 3 | Status: SHIPPED | OUTPATIENT
Start: 2023-07-27 | End: 2024-07-16

## 2023-07-27 NOTE — TELEPHONE ENCOUNTER
[facility] sent Rx request for the following:    XARELTO 20 MG TABLET   VALSARTAN-HCTZ 160-25 MG TAB   Last Prescription Date:   9/2/22  Last Fill Qty/Refills:         90, R-3    Last Office Visit:              7/17/23   Future Office visit:           none   In clinical absence of patient's primary, Robert Herrera, patient is requesting that this message be sent to the covering provider for consideration please.  Gini Calle RN on 7/27/2023 at 10:49 AM

## 2023-07-28 ENCOUNTER — TELEPHONE (OUTPATIENT)
Dept: FAMILY MEDICINE | Facility: OTHER | Age: 83
End: 2023-07-28
Payer: MEDICARE

## 2023-07-28 NOTE — TELEPHONE ENCOUNTER
Patient left voicemail requesting call back regarding prescription. Left message on machine to call back. Radha Adan RN .............. 7/28/2023  9:48 AM

## 2023-07-28 NOTE — TELEPHONE ENCOUNTER
Called and spoke to Patient after verifying last name and date of birth. Pt informed of recent prescription details. Pt's questions answered. Radha Adan RN .............. 7/28/2023  11:55 AM

## 2023-08-07 ENCOUNTER — NURSE TRIAGE (OUTPATIENT)
Dept: FAMILY MEDICINE | Facility: OTHER | Age: 83
End: 2023-08-07
Payer: MEDICARE

## 2023-08-07 DIAGNOSIS — E03.9 HYPOTHYROIDISM, UNSPECIFIED TYPE: Primary | ICD-10-CM

## 2023-08-07 NOTE — TELEPHONE ENCOUNTER
Spoke with patient and gave provider's recommendation below- verbalized understanding. Patient was transferred to appointment line to schedule lab appointment and followup appointment with PCP    Corinne R Thayer, RN on 8/7/2023 at 9:46 AM

## 2023-08-07 NOTE — TELEPHONE ENCOUNTER
"S-(situation): dizziness    B-(background): Patient was seen in clinic on 7/17/23 in which TSH labs were elevated and she was started on levothyroxine. She was also started on b12 at this appointment as well    A-(assessment): Patient reports increased dizziness since starting new medications. Does not have a blood pressure monitor at home to check blood pressures during episodes. States these episodes do occur throughout the day and are enough to \"bother her\". States symptoms do appear slightly better today.    R-(recommendations): Per protocol, discuss with provider to advise. Also, patient is wishing to setup return lab appointment- orders pended per last office visit. Please add other orders as needed    Corinne R Thayer, RN on 8/7/2023 at 9:10 AM    Reason for Disposition   Taking a medicine that could cause dizziness (e.g., blood pressure medications, diuretics)    Additional Information   Negative: SEVERE difficulty breathing (e.g., struggling for each breath, speaks in single words)   Negative: Shock suspected (e.g., cold/pale/clammy skin, too weak to stand, low BP, rapid pulse)   Negative: Difficult to awaken or acting confused (e.g., disoriented, slurred speech)   Negative: Fainted, and still feels dizzy afterwards   Negative: Overdose (accidental or intentional) of medications   Negative: New neurologic deficit that is present now: * Weakness of the face, arm, or leg on one side of the body * Numbness of the face, arm, or leg on one side of the body * Loss of speech or garbled speech   Negative: Heart beating < 50 beats per minute OR > 140 beats per minute   Negative: Sounds like a life-threatening emergency to the triager   Negative: Chest pain   Negative: Rectal bleeding, bloody stool, or tarry-black stool   Negative: Vomiting is main symptom   Negative: Diarrhea is main symptom   Negative: Headache is main symptom   Negative: Heat exhaustion suspected (i.e., dehydration from heat exposure)   Negative: " Patient states that they are having an anxiety or panic attack   Negative: Dizziness from low blood sugar (i.e., < 60 mg/dl or 3.5 mmol/l)   Negative: SEVERE dizziness (e.g., unable to stand, requires support to walk, feels like passing out now)   Negative: SEVERE headache or neck pain   Negative: Spinning or tilting sensation (vertigo) present now and one or more stroke risk factors (i.e., hypertension, diabetes mellitus, prior stroke/TIA, heart attack, age over 60) (Exception: prior physician evaluation for this AND no different/worse than usual)   Negative: Neurologic deficit that was brief (now gone), ANY of the following:* Weakness of the face, arm, or leg on one side of the body* Numbness of the face, arm, or leg on one side of the body* Loss of speech or garbled speech   Negative: Loss of vision or double vision  (Exception: Similar to previous migraines.)   Negative: Extra heart beats OR irregular heart beating (i.e., 'palpitations')   Negative: Difficulty breathing   Negative: Drinking very little and has signs of dehydration (e.g., no urine > 12 hours, very dry mouth, very lightheaded)   Negative: Follows bleeding (e.g., stomach, rectum, vagina)  (Exception: Became dizzy from sight of small amount blood.)   Negative: Patient sounds very sick or weak to the triager   Negative: Lightheadedness (dizziness) present now, after 2 hours of rest and fluids   Negative: Spinning or tilting sensation (vertigo) present now   Negative: Fever > 103 F (39.4 C)   Negative: Fever > 100.0 F (37.8 C) and has diabetes mellitus or a weak immune system (e.g., HIV positive, cancer chemotherapy, organ transplant, splenectomy, chronic steroids)   Negative: MODERATE dizziness (e.g., interferes with normal activities) (Exception: dizziness caused by heat exposure, sudden standing, or poor fluid intake)   Negative: Vomiting occurs with dizziness   Negative: Patient wants to be seen    Protocols used: Dizziness-A-[place]

## 2023-08-07 NOTE — TELEPHONE ENCOUNTER
Repeat TSH ordered.  Would recommend follow-up in clinic afterwards to review labs and to reassess her dizziness.

## 2023-08-08 DIAGNOSIS — E03.9 HYPOTHYROIDISM, UNSPECIFIED TYPE: ICD-10-CM

## 2023-08-08 DIAGNOSIS — E53.8 VITAMIN B12 DEFICIENCY (NON ANEMIC): ICD-10-CM

## 2023-08-08 RX ORDER — LEVOTHYROXINE SODIUM 50 UG/1
50 TABLET ORAL DAILY
Qty: 30 TABLET | Refills: 3 | OUTPATIENT
Start: 2023-08-08

## 2023-08-08 RX ORDER — LANOLIN ALCOHOL/MO/W.PET/CERES
1000 CREAM (GRAM) TOPICAL DAILY
Qty: 30 TABLET | Refills: 3 | OUTPATIENT
Start: 2023-08-08

## 2023-08-08 NOTE — TELEPHONE ENCOUNTER
Patient called and states she will not leave the pharmacy until these are filled. Pt has 4 days remaining.    Per chart review, prescriptions were sent to [facility] on 23:    levothyroxine (SYNTHROID/LEVOTHROID) 50 MCG tablet 30 tablet 3 2023  --   Sig - Route: Take 1 tablet (50 mcg) by mouth daily - Oral     cyanocobalamin (VITAMIN B-12) 1000 MCG tablet 30 tablet 3 2023  --   Sig - Route: Take 1 tablet (1,000 mcg) by mouth daily - Oral     Called [facility] and spoke with Kimberly, after verifying Pt's last name and . Ok to disregard. Radha Adan RN .............. 2023  2:41 PM

## 2023-08-28 ENCOUNTER — OFFICE VISIT (OUTPATIENT)
Dept: FAMILY MEDICINE | Facility: OTHER | Age: 83
End: 2023-08-28
Attending: FAMILY MEDICINE
Payer: MEDICARE

## 2023-08-28 ENCOUNTER — LAB (OUTPATIENT)
Dept: LAB | Facility: OTHER | Age: 83
End: 2023-08-28
Attending: FAMILY MEDICINE
Payer: MEDICARE

## 2023-08-28 VITALS
TEMPERATURE: 97.3 F | HEART RATE: 74 BPM | BODY MASS INDEX: 27.97 KG/M2 | SYSTOLIC BLOOD PRESSURE: 128 MMHG | OXYGEN SATURATION: 92 % | DIASTOLIC BLOOD PRESSURE: 84 MMHG | WEIGHT: 174 LBS | RESPIRATION RATE: 18 BRPM | HEIGHT: 66 IN

## 2023-08-28 DIAGNOSIS — E03.9 HYPOTHYROIDISM, UNSPECIFIED TYPE: Primary | ICD-10-CM

## 2023-08-28 DIAGNOSIS — N18.31 CHRONIC KIDNEY DISEASE, STAGE 3A (H): ICD-10-CM

## 2023-08-28 DIAGNOSIS — I50.32 CHRONIC DIASTOLIC HEART FAILURE (H): ICD-10-CM

## 2023-08-28 DIAGNOSIS — E53.8 VITAMIN B12 DEFICIENCY (NON ANEMIC): ICD-10-CM

## 2023-08-28 DIAGNOSIS — R06.09 DYSPNEA ON EXERTION: ICD-10-CM

## 2023-08-28 DIAGNOSIS — E78.00 PURE HYPERCHOLESTEROLEMIA: ICD-10-CM

## 2023-08-28 DIAGNOSIS — E03.9 HYPOTHYROIDISM, UNSPECIFIED TYPE: ICD-10-CM

## 2023-08-28 LAB
ANION GAP SERPL CALCULATED.3IONS-SCNC: 12 MMOL/L (ref 7–15)
BUN SERPL-MCNC: 27.9 MG/DL (ref 8–23)
CALCIUM SERPL-MCNC: 9.7 MG/DL (ref 8.8–10.2)
CHLORIDE SERPL-SCNC: 92 MMOL/L (ref 98–107)
CREAT SERPL-MCNC: 1.51 MG/DL (ref 0.51–0.95)
DEPRECATED HCO3 PLAS-SCNC: 34 MMOL/L (ref 22–29)
GFR SERPL CREATININE-BSD FRML MDRD: 34 ML/MIN/1.73M2
GLUCOSE SERPL-MCNC: 154 MG/DL (ref 70–99)
HOLD SPECIMEN: NORMAL
HOLD SPECIMEN: NORMAL
NT-PROBNP SERPL-MCNC: 557 PG/ML (ref 0–1800)
POTASSIUM SERPL-SCNC: 3.2 MMOL/L (ref 3.4–5.3)
SODIUM SERPL-SCNC: 138 MMOL/L (ref 136–145)
TSH SERPL DL<=0.005 MIU/L-ACNC: 2.7 UIU/ML (ref 0.3–4.2)
VIT B12 SERPL-MCNC: 852 PG/ML (ref 232–1245)

## 2023-08-28 PROCEDURE — 82607 VITAMIN B-12: CPT | Mod: ZL

## 2023-08-28 PROCEDURE — 99214 OFFICE O/P EST MOD 30 MIN: CPT | Performed by: FAMILY MEDICINE

## 2023-08-28 PROCEDURE — 36415 COLL VENOUS BLD VENIPUNCTURE: CPT | Mod: ZL

## 2023-08-28 PROCEDURE — 84443 ASSAY THYROID STIM HORMONE: CPT | Mod: ZL

## 2023-08-28 PROCEDURE — 83880 ASSAY OF NATRIURETIC PEPTIDE: CPT | Mod: ZL

## 2023-08-28 PROCEDURE — 82435 ASSAY OF BLOOD CHLORIDE: CPT | Mod: ZL

## 2023-08-28 PROCEDURE — G0463 HOSPITAL OUTPT CLINIC VISIT: HCPCS

## 2023-08-28 RX ORDER — LEVOTHYROXINE SODIUM 50 UG/1
50 TABLET ORAL DAILY
Qty: 90 TABLET | Refills: 3 | Status: SHIPPED | OUTPATIENT
Start: 2023-08-28 | End: 2024-07-16

## 2023-08-28 RX ORDER — FUROSEMIDE 20 MG
20 TABLET ORAL 2 TIMES DAILY
Qty: 180 TABLET | Refills: 3 | Status: SHIPPED | OUTPATIENT
Start: 2023-08-28 | End: 2023-09-14

## 2023-08-28 RX ORDER — ATORVASTATIN CALCIUM 20 MG/1
10 TABLET, FILM COATED ORAL DAILY
Qty: 45 TABLET | Refills: 3 | Status: SHIPPED | OUTPATIENT
Start: 2023-08-28 | End: 2024-07-30

## 2023-08-28 ASSESSMENT — PAIN SCALES - GENERAL: PAINLEVEL: NO PAIN (0)

## 2023-08-28 NOTE — PROGRESS NOTES
Assessment & Plan     Hypothyroidism, unspecified type  TSH has normalized.  Continue Synthroid 50 mcg daily.  This was filled for a year.  - Brain Natriuretic Peptide (BNP); Future  - levothyroxine (SYNTHROID/LEVOTHROID) 50 MCG tablet; Take 1 tablet (50 mcg) by mouth daily    Chronic diastolic heart failure (H)  We will recheck BNP and BMP.  If these are still elevated but kidney function is normal could consider increasing her Lasix dosing.  She has a cardiology visit scheduled in September.  - Basic Metabolic Panel; Future    Chronic kidney disease, stage 3a (H)  Stable, recheck BMP  - Basic Metabolic Panel; Future    Pure hypercholesterolemia  Continue Lipitor  - atorvastatin (LIPITOR) 20 MG tablet; Take 0.5 tablets (10 mg) by mouth daily    Vitamin B12 deficiency (non anemic)  Recheck B12 level  - Vitamin B12; Future           No follow-ups on file.    Robert Herrera MD  [facility] AND \[facility] [place] [facility]\""   Lelo is a 83 year old, presenting for the following health issues:  Thyroid Problem (Follow up regarding Thyroid  )    History of hypothyroidism.  At her last visit she started Synthroid 50 mcg daily.  She tolerated this well.  She initially had some dizziness with this but that has since resolved.    She has a history diastolic heart failure and continues on Lasix 20 mg twice daily.  She has lost 6 pounds since her last visit 6 weeks ago.    History of hyperlipidemia, hypertension, recurrent pulmonary embolism and vitamin B12 deficiency, although to have been stable.    History of Present Illness       Heart Failure:  She presents for follow up of heart failure. She is experiencing shortness of breath with activity only, which is same as usual. She is not experiencing any lower extremity edema.   She denies orthopenea and is not coughing at night. Patient is not checking weight daily. She has recently had a None.  She has side effects from medications including dizziness and fatigue.  She  "has had no other medical visits for heart failure since the last visit.    Hypothyroidism:     Since last visit, patient describes the following symptoms::  None    She eats 2-3 servings of fruits and vegetables daily.She consumes 0 sweetened beverage(s) daily.She exercises with enough effort to increase her heart rate 9 or less minutes per day.  She exercises with enough effort to increase her heart rate 3 or less days per week.   She is taking medications regularly.       Follow up regarding thyroid        Review of Systems         Objective    /84 (BP Location: Right arm, Patient Position: Sitting, Cuff Size: Adult Regular)   Pulse 74   Temp 97.3  F (36.3  C) (Temporal)   Resp 18   Ht 1.664 m (5' 5.5\")   Wt 78.9 kg (174 lb)   SpO2 92%   Breastfeeding No   BMI 28.51 kg/m    Body mass index is 28.51 kg/m .  Physical Exam   GENERAL: healthy, alert and no distress  RESP: lungs clear to auscultation - no rales, rhonchi or wheezes  CV: regular rate and rhythm, normal S1 S2, no S3 or S4, no murmur, click or rub, no peripheral edema and peripheral pulses strong  MS: no gross musculoskeletal defects noted, no edema  SKIN: no suspicious lesions or rashes  NEURO: Normal strength and tone, mentation intact and speech normal  PSYCH: mentation appears normal, affect normal/bright                      "

## 2023-08-28 NOTE — NURSING NOTE
"Chief Complaint   Patient presents with    Thyroid Problem     Follow up regarding Thyroid         Medication reconciliation completed.    FOOD SECURITY SCREENING QUESTIONS:    The next two questions are to help us understand your food security.  If you are feeling you need any assistance in this area, we have resources available to support you today.    Hunger Vital Signs:  Within the past 12 months we worried whether our food would run out before we got money to buy more. Never  Within the past 12 months the food we bought just didn't last and we didn't have money to get more. Never    Initial /84 (BP Location: Right arm, Patient Position: Sitting, Cuff Size: Adult Regular)   Pulse 74   Temp 97.3  F (36.3  C) (Temporal)   Resp 18   Ht 1.664 m (5' 5.5\")   Wt 78.9 kg (174 lb)   SpO2 92%   Breastfeeding No   BMI 28.51 kg/m   Estimated body mass index is 28.51 kg/m  as calculated from the following:    Height as of this encounter: 1.664 m (5' 5.5\").    Weight as of this encounter: 78.9 kg (174 lb).       Caridad Hernandez LPN .......  8/28/2023  10:32 AM    "

## 2023-09-14 ENCOUNTER — OFFICE VISIT (OUTPATIENT)
Dept: CARDIOLOGY | Facility: OTHER | Age: 83
End: 2023-09-14
Attending: NURSE PRACTITIONER
Payer: MEDICARE

## 2023-09-14 VITALS
RESPIRATION RATE: 16 BRPM | OXYGEN SATURATION: 90 % | HEIGHT: 66 IN | BODY MASS INDEX: 27.48 KG/M2 | DIASTOLIC BLOOD PRESSURE: 62 MMHG | SYSTOLIC BLOOD PRESSURE: 92 MMHG | HEART RATE: 88 BPM | TEMPERATURE: 97.3 F | WEIGHT: 171 LBS

## 2023-09-14 DIAGNOSIS — E87.6 DRUG-INDUCED HYPOKALEMIA: ICD-10-CM

## 2023-09-14 DIAGNOSIS — I10 ESSENTIAL HYPERTENSION: ICD-10-CM

## 2023-09-14 DIAGNOSIS — R60.0 BILATERAL LOWER EXTREMITY EDEMA: ICD-10-CM

## 2023-09-14 DIAGNOSIS — I95.2 HYPOTENSION DUE TO MEDICATION: ICD-10-CM

## 2023-09-14 DIAGNOSIS — R06.09 DYSPNEA ON EXERTION: ICD-10-CM

## 2023-09-14 DIAGNOSIS — I50.30 HEART FAILURE WITH PRESERVED EJECTION FRACTION, NYHA CLASS II (H): Primary | ICD-10-CM

## 2023-09-14 DIAGNOSIS — T50.905A DRUG-INDUCED HYPOKALEMIA: ICD-10-CM

## 2023-09-14 LAB
ATRIAL RATE - MUSE: 75 BPM
DIASTOLIC BLOOD PRESSURE - MUSE: NORMAL MMHG
INTERPRETATION ECG - MUSE: NORMAL
P AXIS - MUSE: 42 DEGREES
PR INTERVAL - MUSE: 156 MS
QRS DURATION - MUSE: 80 MS
QT - MUSE: 410 MS
QTC - MUSE: 457 MS
R AXIS - MUSE: 60 DEGREES
SYSTOLIC BLOOD PRESSURE - MUSE: NORMAL MMHG
T AXIS - MUSE: 25 DEGREES
VENTRICULAR RATE- MUSE: 75 BPM

## 2023-09-14 PROCEDURE — 93010 ELECTROCARDIOGRAM REPORT: CPT | Performed by: INTERNAL MEDICINE

## 2023-09-14 PROCEDURE — 99204 OFFICE O/P NEW MOD 45 MIN: CPT | Performed by: NURSE PRACTITIONER

## 2023-09-14 PROCEDURE — G0463 HOSPITAL OUTPT CLINIC VISIT: HCPCS | Mod: 25

## 2023-09-14 PROCEDURE — 93005 ELECTROCARDIOGRAM TRACING: CPT | Performed by: NURSE PRACTITIONER

## 2023-09-14 RX ORDER — POTASSIUM CHLORIDE 750 MG/1
10 TABLET, EXTENDED RELEASE ORAL 2 TIMES DAILY
Qty: 90 TABLET | Refills: 3 | Status: SHIPPED | OUTPATIENT
Start: 2023-09-14 | End: 2023-09-27

## 2023-09-14 RX ORDER — FUROSEMIDE 20 MG
40 TABLET ORAL EVERY MORNING
Qty: 180 TABLET | Refills: 3 | Status: SHIPPED | OUTPATIENT
Start: 2023-09-14 | End: 2024-07-30

## 2023-09-14 RX ORDER — FUROSEMIDE 20 MG
40 TABLET ORAL EVERY MORNING
Qty: 180 TABLET | Refills: 3 | COMMUNITY
Start: 2023-09-14 | End: 2023-09-14

## 2023-09-14 RX ORDER — VALSARTAN 160 MG/1
160 TABLET ORAL DAILY
Qty: 90 TABLET | Refills: 3 | Status: SHIPPED | OUTPATIENT
Start: 2023-09-14 | End: 2024-07-15

## 2023-09-14 ASSESSMENT — PAIN SCALES - GENERAL: PAINLEVEL: NO PAIN (0)

## 2023-09-14 NOTE — PATIENT INSTRUCTIONS
Stop Diovan (Valsartan-hydrochlorothiazide).  Start taking Valsartan 180 mg once daily in the evening.   Continue on Lasix, take 2 tabs in the morning for total daily dose 40 mg.   Start Potassium 10 meq daily, take with food.   Return for lab only visit in one week.   Follow-up with cardiology in 3 months, sooner if needed.

## 2023-09-14 NOTE — PROGRESS NOTES
"[facility] HEART CARE   CARDIOLOGY CONSULT     Lelo Ríos   [address]  [facility] [address]    Robert Herrera     Chief Complaint   Patient presents with    Consult For     CHF        HPI:   Mrs. Ríos is an 83 year old female who presents for cardiology evaluation with identified heart failure with preserved ejection fraction.  Additional history includes hyperlipidemia, hypertension, history of recurrent pulmonary embolism on chronic oral anticoagulation, stage III CKD and obesity.    Patient with elevated BNP suggestive of congestive heart failure, TTE (7/14/2023) indeterminate LV diastolic function on last imaging, concentric remodeling present. LV systolic function preserved, LVEF 60-65%.    Today, patient describes feeling \"no stamina\", edema improved since started on loop diuretic. She admits to 12 lb weight loss and feeling better. Dyspnea improved. No increased dyspnea reported today. She denies any recurrence of lightheadedness.  She felt slightly lightheaded just after starting Synthroid, this went away after 1 week of use.  She has not experienced any palpitations.  No syncope.  She denies experiencing any chest pain or pressure.    RELEVANT TESTING REVIEWED:  Echocardiogram 7/14/2023  Interpretation Summary  Global and regional left ventricular function is normal with an EF of 60-65%.  Mild right ventricular dilation is present. Global right ventricular function  is normal.  The peak velocity of the tricuspid regurgitant jet is not obtainable.Pulmonary  artery systolic pressure cannot be assessed.  IVC diameter >2.1 cm collapsing <50% with sniff suggests a high RA pressure  estimated at 15 mmHg or greater.    NM Lexiscan stress test 8/11/2020    The nuclear stress test is negative for inducible myocardial ischemia   or infarction.     Left ventricular function is normal.     The left ventricular ejection fraction at rest is 87%.  The left   ventricular ejection fraction at stress is 85%.    "  There is no prior study for comparison.     Procedure: The patient underwent a Lexiscan stress test.     Findings: Resting EKG normal.  Lexiscan and Cardiolite were injected   without difficulty.  The patient's EKG remained unchanged.  Her vital   signs remained stable.     Impression: Completed Lexiscan stress test.  Images for ischemia pending.     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          FAMILY HISTORY:   Family History   Problem Relation Age of Onset    Heart Disease Father         Heart Disease    Other - See Comments Mother         liver cancer    Breast Cancer No family hx of         Cancer-breast          PAST SURGICAL HISTORY:   Past Surgical History:   Procedure Laterality Date    APPENDECTOMY OPEN      No Comments Provided    HYSTERECTOMY TOTAL ABDOMINAL, BILATERAL SALPINGO-OOPHORECTOMY, COMBINED      fibroid uterus with incidental appendectomy    OTHER SURGICAL HISTORY      [id number],BIOPSY THYROID MEDICAL IMAGING,benign thyroid aspiration    TONSILLECTOMY      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  No children  but did raise twin boys for a period of time.   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   She has recently retired from her position at [email or web address].   She also     works for [employer] for [employer].          CURRENT MEDICATIONS:   Prior to Admission medications    Medication Sig Start Date End Date Taking? Authorizing Provider   aspirin 81 MG tablet Take 81 mg by mouth daily   Yes Reported, Patient   atorvastatin (LIPITOR) 20 MG tablet Take 0.5 tablets (10 mg) by mouth daily 8/28/23  Yes Robert Herrera MD   Calcium Carb-Cholecalciferol (CALCIUM 600 + D PO) Take 1 tablet by mouth 2 times daily   Yes Reported, Patient   cyanocobalamin (VITAMIN B-12) 1000 MCG tablet Take 1 tablet (1,000 mcg) by mouth daily 7/17/23  Yes Robert Herrera MD   furosemide (LASIX) 20 MG tablet Take 1 tablet (20 mg) by mouth 2 times daily 8/28/23  Yes Robert Herrera MD   latanoprost (XALATAN) 0.005 % ophthalmic solution Place 1 drop into both eyes At Bedtime 6/11/14  Yes Reported, Patient   levothyroxine (SYNTHROID/LEVOTHROID) 50 MCG tablet Take 1 tablet (50 mcg) by mouth daily 8/28/23  Yes Robert Herrera MD   valsartan-hydrochlorothiazide (DIOVAN HCT) 160-25 MG tablet TAKE 1 TABLET BY MOUTH EVERY DAY 7/27/23  Yes Marcos Mahajan MD   XARELTO ANTICOAGULANT 20 MG TABS tablet TAKE 1 TABLET BY MOUTH DAILY WITH DINNER 7/27/23  Yes Marcos Mahajan MD          ALLERGIES:   No Known Allergies       ROS:   CONSTITUTIONAL: No reported fever or chills.  Positive for weight loss d/t diuresis.  ENT: No visual disturbance, ear ache, epistaxis or sore throat.   CARDIOVASCULAR: No chest pain, chest pressure or chest discomfort. No palpitations.  Lower extremity edema resolved.    RESPIRATORY: Dyspnea improved. No cough, wheezing or hemoptysis.  No orthopnea or PND.  GI: No reported abdominal pain, melena or hematochezia.   : No reported hematuria or dysuria.   NEUROLOGICAL: No  "lightheadedness, dizziness, syncope, ataxia, paresthesias or weakness.   HEMATOLOGIC: No history of anemia. No bleeding or excessive bruising.  Positive for history of recurrent pulmonary embolism.  MUSCULOSKELETAL: No new joint pain or swelling, no muscle pain.  ENDOCRINOLOGIC: No temperature intolerance. No hair or skin changes.  SKIN: No abnormal rashes or sores, no unusual itching.  PSYCHIATRIC: No history of depression or anxiety. No changes in mood, feeling down or anxious. No changes in sleep.      PHYSICAL EXAM:   BP 92/62 (BP Location: Right arm, Patient Position: Sitting, Cuff Size: Adult Regular)   Pulse 88   Temp 97.3  F (36.3  C) (Temporal)   Resp 16   Ht 1.67 m (5' 5.75\")   Wt 77.6 kg (171 lb)   SpO2 90%   BMI 27.81 kg/m    GENERAL: The patient is a well-developed, well-nourished, in no apparent distress.  HEENT: Head is normocephalic and atraumatic. Eyes are symmetrical with normal visual tracking. No icterus, no xanthelasmas. Nares appeared normal without nasal drainage. Mucous membranes are moist, no cyanosis.  NECK: Supple, no cervical bruits, JVP not visible.   CHEST/ LUNGS: Lungs clear to auscultation, no rales, rhonchi or wheezes, no use of accessory muscles, no retractions, respirations unlabored and normal respiratory rate.   CARDIO: Regular rate and rhythm normal with S1 and S2, no S3 or S4 and no murmur, click or rub. Precordium quiet with normal PMI.    ABD: Abdomen is nondistended.   EXTREMITIES: No edema present.  Pedal pulses normal and equal bilaterally.  MUSCULOSKELETAL: No visible joint swelling.   NEUROLOGIC: Alert and oriented X3. Normal speech, gait and affect. No focal neurologic deficits.   SKIN: No jaundice. No rashes or visible skin lesions present. No ecchymosis.     EKG:    NSR with sinus arrhythmia, nonspecific ST-T abnormality.    LAB RESULTS:   Lab on 08/28/2023   Component Date Value Ref Range Status    TSH 08/28/2023 2.70  0.30 - 4.20 uIU/mL Final    Hold Specimen " 08/28/2023 [facility] hospital   Final    Hold Specimen 08/28/2023 hold   Final    Vitamin B12 08/28/2023 852  232 - 1,245 pg/mL Final    N Terminal Pro BNP Outpatient 08/28/2023 557  0 - 1,800 pg/mL Final    Sodium 08/28/2023 138  136 - 145 mmol/L Final    Potassium 08/28/2023 3.2 (L)  3.4 - 5.3 mmol/L Final    Chloride 08/28/2023 92 (L)  98 - 107 mmol/L Final    Carbon Dioxide (CO2) 08/28/2023 34 (H)  22 - 29 mmol/L Final    Anion Gap 08/28/2023 12  7 - 15 mmol/L Final    Urea Nitrogen 08/28/2023 27.9 (H)  8.0 - 23.0 mg/dL Final    Creatinine 08/28/2023 1.51 (H)  0.51 - 0.95 mg/dL Final    Calcium 08/28/2023 9.7  8.8 - 10.2 mg/dL Final    Glucose 08/28/2023 154 (H)  70 - 99 mg/dL Final    GFR Estimate 08/28/2023 34 (L)  >60 mL/min/1.73m2 Final   Office Visit on 07/17/2023   Component Date Value Ref Range Status    Vitamin B12 07/17/2023 228 (L)  232 - 1,245 pg/mL Final    TSH 07/17/2023 7.21 (H)  0.30 - 4.20 uIU/mL Final    Sodium 07/17/2023 138  136 - 145 mmol/L Final    Potassium 07/17/2023 3.2 (L)  3.4 - 5.3 mmol/L Final    Chloride 07/17/2023 87 (L)  98 - 107 mmol/L Final    Carbon Dioxide (CO2) 07/17/2023 43 (H)  22 - 29 mmol/L Final    Anion Gap 07/17/2023 8  7 - 15 mmol/L Final    Urea Nitrogen 07/17/2023 32.2 (H)  8.0 - 23.0 mg/dL Final    Creatinine 07/17/2023 1.54 (H)  0.51 - 0.95 mg/dL Final    Calcium 07/17/2023 11.1 (H)  8.8 - 10.2 mg/dL Final    Glucose 07/17/2023 120 (H)  70 - 99 mg/dL Final    Alkaline Phosphatase 07/17/2023 63  35 - 104 U/L Final    AST 07/17/2023 20  0 - 45 U/L Final    ALT 07/17/2023 11  0 - 50 U/L Final    Protein Total 07/17/2023 6.3 (L)  6.4 - 8.3 g/dL Final    Albumin 07/17/2023 3.5  3.5 - 5.2 g/dL Final    Bilirubin Total 07/17/2023 1.3 (H)  <=1.2 mg/dL Final    GFR Estimate 07/17/2023 33 (L)  >60 mL/min/1.73m2 Final    N Terminal Pro BNP Outpatient 07/17/2023 3,936 (H)  0 - 1,800 pg/mL Final    Hold Specimen 07/17/2023 [facility]   Final    Hold Specimen 07/17/2023 [facility]   Final    Free T4  07/17/2023 1.41  0.90 - 1.70 ng/dL Final   Office Visit on 07/03/2023   Component Date Value Ref Range Status    Ventricular Rate 07/03/2023 86  BPM Final    Atrial Rate 07/03/2023 86  BPM Final    NH Interval 07/03/2023 154  ms Final    QRS Duration 07/03/2023 74  ms Final    QT 07/03/2023 360  ms Final    QTc 07/03/2023 430  ms Final    P Axis 07/03/2023 60  degrees Final    R AXIS 07/03/2023 11  degrees Final    T Axis 07/03/2023 -15  degrees Final    Interpretation ECG 07/03/2023    Final                    Value:Sinus rhythm  Nonspecific ST and T wave abnormality  Abnormal ECG  When compared with ECG of 28-JUL-2020 14:24,  Inverted T waves have replaced nonspecific T wave abnormality in Inferior leads  Nonspecific T wave abnormality now evident in Anterior leads  Confirmed by MD RONDA, JENNIFER ([id number]) on 7/3/2023 3:20:27 PM      N Terminal Pro BNP Outpatient 07/03/2023 7,267 (H)  0 - 1,800 pg/mL Final    Troponin T, High Sensitivity 07/03/2023 32 (H)  <=14 ng/L Final    Sodium 07/03/2023 137  136 - 145 mmol/L Final    Potassium 07/03/2023 3.8  3.4 - 5.3 mmol/L Final    Chloride 07/03/2023 94 (L)  98 - 107 mmol/L Final    Carbon Dioxide (CO2) 07/03/2023 34 (H)  22 - 29 mmol/L Final    Anion Gap 07/03/2023 9  7 - 15 mmol/L Final    Urea Nitrogen 07/03/2023 18.1  8.0 - 23.0 mg/dL Final    Creatinine 07/03/2023 0.98 (H)  0.51 - 0.95 mg/dL Final    Calcium 07/03/2023 9.5  8.8 - 10.2 mg/dL Final    Glucose 07/03/2023 117 (H)  70 - 99 mg/dL Final    Alkaline Phosphatase 07/03/2023 70  35 - 104 U/L Final    AST 07/03/2023 17  0 - 45 U/L Final    ALT 07/03/2023 14  0 - 50 U/L Final    Protein Total 07/03/2023 6.4  6.4 - 8.3 g/dL Final    Albumin 07/03/2023 3.2 (L)  3.5 - 5.2 g/dL Final    Bilirubin Total 07/03/2023 0.8  <=1.2 mg/dL Final    GFR Estimate 07/03/2023 57 (L)  >60 mL/min/1.73m2 Final    WBC Count 07/03/2023 7.3  4.0 - 11.0 10e3/uL Final    RBC Count 07/03/2023 5.09  3.80 - 5.20 10e6/uL Final    Hemoglobin  "07/03/2023 14.8  11.7 - 15.7 g/dL Final    Hematocrit 07/03/2023 48.0 (H)  35.0 - 47.0 % Final    MCV 07/03/2023 94  78 - 100 fL Final    MCH 07/03/2023 29.1  26.5 - 33.0 pg Final    MCHC 07/03/2023 30.8 (L)  31.5 - 36.5 g/dL Final    RDW 07/03/2023 14.0  10.0 - 15.0 % Final    Platelet Count 07/03/2023 285  150 - 450 10e3/uL Final    % Neutrophils 07/03/2023 71  % Final    % Lymphocytes 07/03/2023 18  % Final    % Monocytes 07/03/2023 9  % Final    % Eosinophils 07/03/2023 1  % Final    % Basophils 07/03/2023 1  % Final    % Immature Granulocytes 07/03/2023 0  % Final    NRBCs per 100 WBC 07/03/2023 0  <1 /100 Final    Absolute Neutrophils 07/03/2023 5.2  1.6 - 8.3 10e3/uL Final    Absolute Lymphocytes 07/03/2023 1.3  0.8 - 5.3 10e3/uL Final    Absolute Monocytes 07/03/2023 0.6  0.0 - 1.3 10e3/uL Final    Absolute Eosinophils 07/03/2023 0.0  0.0 - 0.7 10e3/uL Final    Absolute Basophils 07/03/2023 0.1  0.0 - 0.2 10e3/uL Final    Absolute Immature Granulocytes 07/03/2023 0.0  <=0.4 10e3/uL Final    Absolute NRBCs 07/03/2023 0.0  10e3/uL Final          ASSESSMENT:   Lelo Ríos presents for cardiology evaluation with identified heart failure with preserved ejection fraction.  Additional history includes hyperlipidemia, hypertension, history of recurrent pulmonary embolism on chronic oral anticoagulation, stage III CKD and obesity.  Today, patient describes feeling \"no stamina\", edema improved since started on loop diuretic. She admits to 12 lb weight loss and feeling better. Dyspnea improved. No increased dyspnea reported today. She denies any recurrence of lightheadedness.  She felt slightly lightheaded just after starting Synthroid, this went away after 1 week of use.  She has not experienced any palpitations.  No syncope.  She denies experiencing any chest pain or pressure.    PLAN:   1. Heart failure with preserved ejection fraction, NYHA class II (H)  Patient with elevated BNP suggestive of congestive heart " failure, TTE (7/14/2023) indeterminate LV diastolic function on last imaging, concentric remodeling present. LV systolic function preserved, LVEF 60-65%.  Recommended she stop hydrochlorothiazide at this time, hypokalemia secondary to thiazide.   Start Lasix 40 mg daily. Start potassium 10 meq daily.   Return for lab in one week.  Continue on Valsartan 180 mg daily in the evening.     - valsartan (DIOVAN) 160 MG tablet; Take 1 tablet (160 mg) by mouth daily  Dispense: 90 tablet; Refill: 3  - furosemide (LASIX) 20 MG tablet; Take 2 tablets (40 mg) by mouth every morning  Dispense: 180 tablet; Refill: 3  - Basic metabolic panel; Future    2. Dyspnea on exertion    3. Bilateral lower extremity edema  See above.   - furosemide (LASIX) 20 MG tablet; Take 2 tablets (40 mg) by mouth every morning  Dispense: 180 tablet; Refill: 3    4. Drug-induced hypokalemia    5. Hypotension due to medication    Orders Placed This Encounter   Procedures    Basic metabolic panel    EKG 12-lead, tracing only     Thank you for allowing me to participate in the care of your patient. Please do not hesitate to contact me if you have any questions.     Total time 57 minutes on date of encounter spent reviewing records, face-to-face time obtaining HPI, physical exam, reviewing results of recent testing counseling on the above diagnoses and recommended plan of care.    Tere Cárdenas, APRN CNP [facility]

## 2023-09-14 NOTE — NURSING NOTE
"Patient comes in for consult for CHF.  Chief Complaint   Patient presents with    Consult For     CHF       Initial BP 92/62 (BP Location: Right arm, Patient Position: Sitting, Cuff Size: Adult Regular)   Pulse 88   Temp 97.3  F (36.3  C) (Temporal)   Resp 16   Ht 1.67 m (5' 5.75\")   Wt 77.6 kg (171 lb)   SpO2 90%   BMI 27.81 kg/m   Estimated body mass index is 27.81 kg/m  as calculated from the following:    Height as of this encounter: 1.67 m (5' 5.75\").    Weight as of this encounter: 77.6 kg (171 lb).  Meds Reconciled: complete  Pt is on Aspirin  Pt is on a Statin  PHQ and/or ROSE MARIE reviewed. Pt referred to PCP/MH Provider as appropriate.    Arianna Tijerina LPN      "

## 2023-09-21 ENCOUNTER — LAB (OUTPATIENT)
Dept: LAB | Facility: OTHER | Age: 83
End: 2023-09-21
Attending: NURSE PRACTITIONER
Payer: MEDICARE

## 2023-09-21 DIAGNOSIS — I50.30 HEART FAILURE WITH PRESERVED EJECTION FRACTION, NYHA CLASS II (H): ICD-10-CM

## 2023-09-21 LAB
ANION GAP SERPL CALCULATED.3IONS-SCNC: 8 MMOL/L (ref 7–15)
BUN SERPL-MCNC: 17.2 MG/DL (ref 8–23)
CALCIUM SERPL-MCNC: 9.4 MG/DL (ref 8.8–10.2)
CHLORIDE SERPL-SCNC: 96 MMOL/L (ref 98–107)
CREAT SERPL-MCNC: 1.29 MG/DL (ref 0.51–0.95)
DEPRECATED HCO3 PLAS-SCNC: 32 MMOL/L (ref 22–29)
EGFRCR SERPLBLD CKD-EPI 2021: 41 ML/MIN/1.73M2
GLUCOSE SERPL-MCNC: 106 MG/DL (ref 70–99)
POTASSIUM SERPL-SCNC: 4.3 MMOL/L (ref 3.4–5.3)
SODIUM SERPL-SCNC: 136 MMOL/L (ref 136–145)

## 2023-09-21 PROCEDURE — 80048 BASIC METABOLIC PNL TOTAL CA: CPT | Mod: ZL

## 2023-09-21 PROCEDURE — 36415 COLL VENOUS BLD VENIPUNCTURE: CPT | Mod: ZL

## 2023-09-27 ENCOUNTER — TELEPHONE (OUTPATIENT)
Dept: CARDIOLOGY | Facility: OTHER | Age: 83
End: 2023-09-27
Payer: MEDICARE

## 2023-09-27 DIAGNOSIS — T50.905A DRUG-INDUCED HYPOKALEMIA: ICD-10-CM

## 2023-09-27 DIAGNOSIS — E87.6 DRUG-INDUCED HYPOKALEMIA: ICD-10-CM

## 2023-09-27 RX ORDER — POTASSIUM CHLORIDE 750 MG/1
10 TABLET, EXTENDED RELEASE ORAL DAILY
Qty: 90 TABLET | Refills: 3 | Status: SHIPPED | OUTPATIENT
Start: 2023-09-27 | End: 2024-07-30

## 2023-09-27 NOTE — TELEPHONE ENCOUNTER
Question about her potasium dosing     Please call and advise    Thank You    Anayeli Kim on 9/27/2023 at 8:59 AM

## 2023-09-27 NOTE — TELEPHONE ENCOUNTER
Please notify patient that I would like her to remain on potassium 10 meq daily.   JENNY Nation CNP

## 2023-09-27 NOTE — TELEPHONE ENCOUNTER
Patient notified of below note from JENNY Dumas CNP and verbalized understanding.  Sridevi Watts RN......September 27, 2023...11:31 AM

## 2023-09-27 NOTE — TELEPHONE ENCOUNTER
Please see result note from 9/21/23 BMP.  Patient wanting clarification on potassium dose.  To JENNY Dumas CNP to address.  Sridevi Watts RN......September 27, 2023...10:22 AM

## 2023-11-02 DIAGNOSIS — E53.8 VITAMIN B12 DEFICIENCY (NON ANEMIC): ICD-10-CM

## 2023-11-06 RX ORDER — LANOLIN ALCOHOL/MO/W.PET/CERES
1000 CREAM (GRAM) TOPICAL DAILY
Qty: 90 TABLET | Refills: 2 | Status: SHIPPED | OUTPATIENT
Start: 2023-11-06 | End: 2024-07-16

## 2023-11-06 NOTE — TELEPHONE ENCOUNTER
[facility] in #[id number] in [facility] [place] sent Rx request for the following:      Requested Prescriptions   Pending Prescriptions Disp Refills    cyanocobalamin (VITAMIN B-12) 1000 MCG tablet [Pharmacy Med Name: VITAMIN B-12 1,000 MCG TABLET] 90 tablet 1     Sig: TAKE 1 TABLET BY MOUTH EVERY DAY   Last Prescription Date:   7/17/23  Last Fill Qty/Refills:         30, R-3    Last Office Visit:              8/28/23   Future Office visit:           None    Prescription approved per [facility] Refill Protocol.    Radha Adan RN .............. 11/6/2023  9:03 AM

## 2024-01-09 ENCOUNTER — OFFICE VISIT (OUTPATIENT)
Dept: CARDIOLOGY | Facility: OTHER | Age: 84
End: 2024-01-09
Attending: NURSE PRACTITIONER
Payer: MEDICARE

## 2024-01-09 VITALS
OXYGEN SATURATION: 95 % | HEART RATE: 76 BPM | BODY MASS INDEX: 27.77 KG/M2 | TEMPERATURE: 97.6 F | SYSTOLIC BLOOD PRESSURE: 124 MMHG | DIASTOLIC BLOOD PRESSURE: 80 MMHG | WEIGHT: 172.8 LBS | RESPIRATION RATE: 14 BRPM | HEIGHT: 66 IN

## 2024-01-09 DIAGNOSIS — I10 ESSENTIAL HYPERTENSION: ICD-10-CM

## 2024-01-09 DIAGNOSIS — T50.905A DRUG-INDUCED HYPOKALEMIA: ICD-10-CM

## 2024-01-09 DIAGNOSIS — E53.8 VITAMIN B12 DEFICIENCY (NON ANEMIC): ICD-10-CM

## 2024-01-09 DIAGNOSIS — I50.30 HEART FAILURE WITH PRESERVED EJECTION FRACTION, NYHA CLASS II (H): Primary | ICD-10-CM

## 2024-01-09 DIAGNOSIS — E87.6 DRUG-INDUCED HYPOKALEMIA: ICD-10-CM

## 2024-01-09 LAB
ANION GAP SERPL CALCULATED.3IONS-SCNC: 8 MMOL/L (ref 7–15)
BUN SERPL-MCNC: 21.8 MG/DL (ref 8–23)
CALCIUM SERPL-MCNC: 9.7 MG/DL (ref 8.8–10.2)
CHLORIDE SERPL-SCNC: 95 MMOL/L (ref 98–107)
CREAT SERPL-MCNC: 1.12 MG/DL (ref 0.51–0.95)
DEPRECATED HCO3 PLAS-SCNC: 35 MMOL/L (ref 22–29)
EGFRCR SERPLBLD CKD-EPI 2021: 49 ML/MIN/1.73M2
GLUCOSE SERPL-MCNC: 109 MG/DL (ref 70–99)
HOLD SPECIMEN: NORMAL
NT-PROBNP SERPL-MCNC: 221 PG/ML (ref 0–1800)
POTASSIUM SERPL-SCNC: 4.1 MMOL/L (ref 3.4–5.3)
SODIUM SERPL-SCNC: 138 MMOL/L (ref 135–145)

## 2024-01-09 PROCEDURE — 99214 OFFICE O/P EST MOD 30 MIN: CPT | Performed by: NURSE PRACTITIONER

## 2024-01-09 PROCEDURE — 36415 COLL VENOUS BLD VENIPUNCTURE: CPT | Mod: ZL | Performed by: NURSE PRACTITIONER

## 2024-01-09 PROCEDURE — 83880 ASSAY OF NATRIURETIC PEPTIDE: CPT | Mod: ZL | Performed by: NURSE PRACTITIONER

## 2024-01-09 PROCEDURE — 80048 BASIC METABOLIC PNL TOTAL CA: CPT | Mod: ZL | Performed by: NURSE PRACTITIONER

## 2024-01-09 PROCEDURE — G0463 HOSPITAL OUTPT CLINIC VISIT: HCPCS

## 2024-01-09 RX ORDER — TIMOLOL MALEATE 5 MG/ML
1 SOLUTION/ DROPS OPHTHALMIC DAILY
COMMUNITY
Start: 2023-10-23

## 2024-01-09 ASSESSMENT — PAIN SCALES - GENERAL: PAINLEVEL: NO PAIN (0)

## 2024-01-09 NOTE — PROGRESS NOTES
"[facility] HEART CARE   CARDIOLOGY PROGRESS NOTE    Lelo Ríos   [address]  [facility] [address]    Robert Herrera     Chief Complaint   Patient presents with    Follow Up     3 month for Heart Failure     Medication Follow-up     Why does she need to take B12 and K-Dur?         HPI:   Mrs. Ríos is an 83 year old female who presents for cardiology follow-up to visit on 9/14/2023 with identified heart failure with preserved ejection fraction.  Additional history includes hyperlipidemia, hypertension, history of recurrent pulmonary embolism on chronic oral anticoagulation, stage III CKD and obesity.    Patient with elevated BNP suggestive of congestive heart failure, TTE (7/14/2023) indeterminate LV diastolic function on last imaging, concentric remodeling present. LV systolic function preserved, LVEF 60-65%.    At her last visit in September patient described feeling \"no stamina\", edema improved since started on loop diuretic. She admits to 12 lb weight loss and feeling better. Dyspnea improved. She denied any recurrence of lightheadedness. No palpitations.  No syncope.  She denies experiencing any chest pain or pressure.    Today, patient admits to feeling good. No increased edema. Weight has been stable. No chest pain or pressure. No palpitations, lightheadedness or syncope. Chronic dyspnea, largely unchanged. Reviewed indications for potassium and B12 supplementation.     RELEVANT TESTING REVIEWED:  Echocardiogram 7/14/2023  Interpretation Summary  Global and regional left ventricular function is normal with an EF of 60-65%.  Mild right ventricular dilation is present. Global right ventricular function  is normal.  The peak velocity of the tricuspid regurgitant jet is not obtainable.Pulmonary  artery systolic pressure cannot be assessed.  IVC diameter >2.1 cm collapsing <50% with sniff suggests a high RA pressure  estimated at 15 mmHg or greater.    NM Lexiscan stress test 8/11/2020    The nuclear " stress test is negative for inducible myocardial ischemia   or infarction.     Left ventricular function is normal.     The left ventricular ejection fraction at rest is 87%.  The left   ventricular ejection fraction at stress is 85%.     There is no prior study for comparison.     Procedure: The patient underwent a Lexiscan stress test.     Findings: Resting EKG normal.  Lexiscan and Cardiolite were injected   without difficulty.  The patient's EKG remained unchanged.  Her vital   signs remained stable.     Impression: Completed Lexiscan stress test.  Images for ischemia pending.     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          FAMILY HISTORY:   Family History   Problem Relation Age of Onset    Heart Disease Father         Heart Disease    Other - See Comments Mother         liver cancer    Breast Cancer No family hx of         Cancer-breast          PAST SURGICAL HISTORY:   Past Surgical History:   Procedure Laterality Date    APPENDECTOMY OPEN      No Comments Provided    HYSTERECTOMY TOTAL ABDOMINAL, BILATERAL SALPINGO-OOPHORECTOMY, COMBINED      fibroid uterus with incidental appendectomy    OTHER SURGICAL HISTORY      [id number],BIOPSY THYROID MEDICAL IMAGING,benign thyroid aspiration    TONSILLECTOMY      born without tonsils          SOCIAL HISTORY:   Social History     Socioeconomic History    Marital status: Single     Spouse name: None    Number of children: None    Years of education: None    Highest education level: None   Tobacco Use    Smoking status: Former    Smokeless tobacco: Never    Tobacco comments:     Quit smoking: previously a social smoker   Vaping Use    Vaping Use: Never  used   Substance and Sexual Activity    Alcohol use: Not Currently     Comment: Alcoholic Drinks/day: occassionally    Drug use: Never    Sexual activity: Not Currently     Partners: Male   Social History Narrative    Single.  No children but did raise twin boys for a period of time.   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   She has recently retired from her position at [employer].   She also     works for [employer] for [employer].          CURRENT MEDICATIONS:   Current Outpatient Medications   Medication    aspirin 81 MG tablet    atorvastatin (LIPITOR) 20 MG tablet    Calcium Carb-Cholecalciferol (CALCIUM 600 + D PO)    cyanocobalamin (VITAMIN B-12) 1000 MCG tablet    furosemide (LASIX) 20 MG tablet    latanoprost (XALATAN) 0.005 % ophthalmic solution    levothyroxine (SYNTHROID/LEVOTHROID) 50 MCG tablet    potassium chloride ER (KLOR-CON M) 10 MEQ CR tablet    timolol maleate (TIMOPTIC) 0.5 % ophthalmic solution    valsartan (DIOVAN) 160 MG tablet    XARELTO ANTICOAGULANT 20 MG TABS tablet     No current facility-administered medications for this visit.     ALLERGIES:   No Known Allergies       ROS:   CONSTITUTIONAL: No reported fever or chills. Weight stable.   ENT: No visual disturbance, ear ache, epistaxis or sore throat.   CARDIOVASCULAR: No chest pain, chest pressure or chest discomfort. No palpitations.  No increased edema.   RESPIRATORY: Chronic dyspnea, stable. No cough, wheezing or hemoptysis.  No orthopnea or PND.  GI: No reported abdominal pain, melena or hematochezia.   : No reported hematuria or dysuria.   NEUROLOGICAL: No lightheadedness, dizziness, syncope, ataxia, paresthesias or weakness.   HEMATOLOGIC: No history of anemia. No bleeding or excessive bruising.  Positive for history of recurrent pulmonary embolism.  MUSCULOSKELETAL: No new joint pain or swelling, no muscle pain.  ENDOCRINOLOGIC: No temperature intolerance.  "No hair or skin changes.  SKIN: No abnormal rashes or sores, no unusual itching.  PSYCHIATRIC: No history of depression or anxiety. No changes in mood, feeling down or anxious.       PHYSICAL EXAM:   /80 (BP Location: Right arm, Patient Position: Sitting, Cuff Size: Adult Regular)   Pulse 76   Temp 97.6  F (36.4  C) (Temporal)   Resp 14   Ht 1.67 m (5' 5.75\")   Wt 78.4 kg (172 lb 12.8 oz)   SpO2 95%   BMI 28.10 kg/m    GENERAL: The patient is a well-developed, well-nourished, in no apparent distress.  HEENT: Head is normocephalic and atraumatic. Eyes are symmetrical with normal visual tracking. No icterus, no xanthelasmas. Nares appeared normal without nasal drainage. Mucous membranes are moist, no cyanosis.  NECK: Supple, no cervical bruits, JVP not visible.   CHEST/ LUNGS: Lungs clear to auscultation, no rales, rhonchi or wheezes, no use of accessory muscles, no retractions, respirations unlabored and normal respiratory rate.   CARDIO: Regular rate and rhythm normal with S1 and S2, no S3 or S4 and no murmur, click or rub.   ABD: Abdomen is nondistended.   EXTREMITIES: Trace LE edema present bilaterally.    MUSCULOSKELETAL: No visible joint swelling.   NEUROLOGIC: Alert and oriented X3. Normal speech, gait and affect. No focal neurologic deficits.   SKIN: No jaundice. No rashes or visible skin lesions present. No ecchymosis.     LAB RESULTS:   Lab on 09/21/2023   Component Date Value Ref Range Status    Sodium 09/21/2023 136  136 - 145 mmol/L Final    Potassium 09/21/2023 4.3  3.4 - 5.3 mmol/L Final    Chloride 09/21/2023 96 (L)  98 - 107 mmol/L Final    Carbon Dioxide (CO2) 09/21/2023 32 (H)  22 - 29 mmol/L Final    Anion Gap 09/21/2023 8  7 - 15 mmol/L Final    Urea Nitrogen 09/21/2023 17.2  8.0 - 23.0 mg/dL Final    Creatinine 09/21/2023 1.29 (H)  0.51 - 0.95 mg/dL Final    Calcium 09/21/2023 9.4  8.8 - 10.2 mg/dL Final    Glucose 09/21/2023 106 (H)  70 - 99 mg/dL Final    GFR Estimate 09/21/2023 41 " (L)  >60 mL/min/1.73m2 Final         ASSESSMENT:   Lelo Ríos presents for cardiology follow-up to visit on 9/14/2023 with identified heart failure with preserved ejection fraction.  Additional history includes hyperlipidemia, hypertension, history of recurrent pulmonary embolism on chronic oral anticoagulation, stage III CKD and obesity.  Today, patient admits to feeling good. No increased edema. Weight has been stable. No chest pain or pressure. No palpitations, lightheadedness or syncope. Chronic dyspnea, largely unchanged. Reviewed indications for potassium and B12 supplementation.     PLAN:   1. Heart failure with preserved ejection fraction, NYHA class II (H)  Patient with elevated BNP suggestive of congestive heart failure, TTE (7/14/2023) indeterminate LV diastolic function on last imaging, concentric remodeling present. LV systolic function preserved, LVEF 60-65%.  No recent CHF hospitalizations or end organ dysfunction.  Continue on Lasix 40 mg daily and potassium 10 meq daily.     2. Drug-induced hypokalemia    3. Vitamin B12 deficiency (non anemic)    4. Essential hypertension  BP controlled on valsartan and Furosemide.    Labs reviewed and stable. Improved renal function and stable electrolytes.     Orders Placed This Encounter   Procedures    Basic metabolic panel    N terminal pro BNP outpatient    Extra Tube    Extra Serum Separator Tube (SST)     Thank you for allowing me to participate in the care of your patient. Please do not hesitate to contact me if you have any questions.     Tere Cárdenas, JENNY CNP CHFN

## 2024-01-09 NOTE — NURSING NOTE
"Chief Complaint   Patient presents with    Follow Up     3 month for Heart Failure        Initial /80 (BP Location: Right arm, Patient Position: Sitting, Cuff Size: Adult Regular)   Pulse 76   Temp 97.6  F (36.4  C) (Temporal)   Resp 14   Ht 1.67 m (5' 5.75\")   Wt 78.4 kg (172 lb 12.8 oz)   SpO2 95%   BMI 28.10 kg/m   Estimated body mass index is 28.1 kg/m  as calculated from the following:    Height as of this encounter: 1.67 m (5' 5.75\").    Weight as of this encounter: 78.4 kg (172 lb 12.8 oz).  Meds Reconciled: complete  Pt is on Aspirin  Pt is on a Statin  Pt is on Xarelto or Eliquis  Pt is not on a Warfarin   PHQ and/or ROSE MARIE reviewed. Pt referred to PCP/MH Provider as appropriate.    Adrienne Payne LPN         "

## 2024-07-10 DIAGNOSIS — E53.8 VITAMIN B12 DEFICIENCY (NON ANEMIC): ICD-10-CM

## 2024-07-10 DIAGNOSIS — I10 ESSENTIAL HYPERTENSION: ICD-10-CM

## 2024-07-10 DIAGNOSIS — I26.99 RECURRENT PULMONARY EMBOLISM (H): ICD-10-CM

## 2024-07-10 DIAGNOSIS — E03.9 HYPOTHYROIDISM, UNSPECIFIED TYPE: ICD-10-CM

## 2024-07-10 DIAGNOSIS — I50.30 HEART FAILURE WITH PRESERVED EJECTION FRACTION, NYHA CLASS II (H): ICD-10-CM

## 2024-07-11 NOTE — TELEPHONE ENCOUNTER
Requested Prescriptions   Pending Prescriptions Disp Refills    valsartan (DIOVAN) 160 MG tablet [Pharmacy Med Name: VALSARTAN 160 MG TABLET] 90 tablet 3     Sig: TAKE 1 TABLET BY MOUTH EVERY DAY       Angiotensin-II Receptors Failed - 7/10/2024  4:39 PM        Failed - Has GFR on file in past 12 months and most recent value is normal        Passed - Last blood pressure under 140/90 in past 12 months     BP Readings from Last 3 Encounters:   01/09/24 124/80   09/14/23 92/62   08/28/23 128/84   No data recorded        Passed - Medication is active on med list        Passed - Medication indicated for associated diagnosis     The medication is prescribed for one or more of the following conditions:    Chronic Kidney Disease (CDK)    Heart Failure (HF)    Diabetes, Nephropathy   Hypertension    Coronary Artery Disease (CAD)   Raynaud's Disease        Passed - Recent (12 mo) or future (90days) visit within the authorizing provider's specialty     The patient must have completed an in-person or virtual visit within the past 12 months or has a future visit scheduled within the next 90 days with the authorizing provider s specialty.  Urgent care and e-visits do not quality as an office visit for this protocol.        Passed - Patient is age 18 or older        Passed - No active pregnancy on record        Passed - Normal serum potassium on file in past 12 months     Recent Labs   Lab Test 01/09/24  1307   POTASSIUM 4.1            Passed - No positive pregnancy test in past 12 months     Last Written Prescription Date:  9/14/23  Last Fill Quantity: 90,   # refills: 3  Last Office Visit: 1/9/24  Future Office visit:    Next 5 appointments (look out 90 days)      Oct 07, 2024 1:45 PM  (Arrive by 1:30 PM)  Return Visit with JENNY Cardenas [facility] ([facility] ) [address]  [place] [address] [phone]     Routing refill request to  provider for review/approval because:  Abnormal GFR    Unable to complete prescription refill per RN Medication Refill Policy.   Sridevi Watts RN ....................  7/11/2024   10:51 AM     No...

## 2024-07-15 RX ORDER — VALSARTAN 160 MG/1
160 TABLET ORAL DAILY
Qty: 90 TABLET | Refills: 3 | Status: SHIPPED | OUTPATIENT
Start: 2024-07-15

## 2024-07-16 RX ORDER — LEVOTHYROXINE SODIUM 50 UG/1
50 TABLET ORAL DAILY
Qty: 90 TABLET | Refills: 0 | Status: SHIPPED | OUTPATIENT
Start: 2024-07-16 | End: 2024-07-30

## 2024-07-16 RX ORDER — RIVAROXABAN 20 MG/1
20 TABLET, FILM COATED ORAL
Qty: 30 TABLET | Refills: 0 | Status: SHIPPED | OUTPATIENT
Start: 2024-07-16 | End: 2024-07-30

## 2024-07-16 RX ORDER — LANOLIN ALCOHOL/MO/W.PET/CERES
1000 CREAM (GRAM) TOPICAL DAILY
Qty: 90 TABLET | Refills: 0 | Status: SHIPPED | OUTPATIENT
Start: 2024-07-16 | End: 2024-07-30

## 2024-07-16 NOTE — TELEPHONE ENCOUNTER
[facility] sent Rx request for the following:      Requested Prescriptions   Pending Prescriptions Disp Refills    cyanocobalamin (VITAMIN B-12) 1000 MCG tablet [Pharmacy Med Name: VITAMIN B-12 1,000 MCG TABLET] 90 tablet 2     Sig: TAKE 1 TABLET BY MOUTH EVERY DAY       Vitamin Supplements Protocol Passed - 7/16/2024 10:43 AM    levothyroxine (SYNTHROID/LEVOTHROID) 50 MCG tablet [Pharmacy Med Name: LEVOTHYROXINE 50 MCG TABLET] 90 tablet 3     Sig: TAKE 1 TABLET BY MOUTH EVERY DAY       Thyroid Protocol Passed - 7/16/2024 10:43 AM   Last Prescription Date:   8/28/23, 11/6/23  Last Fill Qty/Refills:         90, R-2, 3    Last Office Visit:              8/28/23   Future Office visit:             Next 5 appointments (look out 90 days)      Jul 30, 2024 1:20 PM  (Arrive by 1:05 PM)  Provider Visit with Clarence Laureano MD  [facility] ([facility] ) [address]  [place] [address]  [phone]     Oct 07, 2024 1:45 PM  (Arrive by 1:30 PM)  Return Visit with JENNY Cardenas CNP  [facility] ([facility] ) [address]  [place] [address]  [phone]         Gini Calle RN on 7/16/2024 at 10:49 AM

## 2024-07-16 NOTE — TELEPHONE ENCOUNTER
[facility] sent Rx request for the following:      Requested Prescriptions   Pending Prescriptions Disp Refills    XARELTO ANTICOAGULANT 20 MG TABS tablet [Pharmacy Med Name: XARELTO 20 MG TABLET] 90 tablet 3     Sig: TAKE 1 TABLET BY MOUTH EVERY DAY WITH DINNER       Anticoagulant Agents Failed - 7/16/2024  9:55 AM        Failed - Normal Platelets on file in past 12 months     Recent Labs   Lab Test 07/03/23  1308                  Failed - Creatinine Clearance greater than 50 ml/min on file in past 3 mos     No lab results found.          Failed - Serum creatinine less than or equal to 1.4 on file in past 3 mos     Recent Labs   Lab Test 01/09/24  1307   CR 1.12*                 Failed - GFR on file in the past 12 months and most recent GFR is normal        Failed - Recent (6 mo) or future (90 days) visit within the authorizing provider's specialty     Last Prescription Date:   7/27/23  Last Fill Qty/Refills:         90, R-3    Last Office Visit:              7/3/23 (Recurrent PE discussed - DWS)  Future Office visit:           7/30/24  Next 5 appointments (look out 90 days)      Jul 30, 2024 1:20 PM  (Arrive by 1:05 PM)  Provider Visit with Clarence Laureano MD  [facility] ([facility] ) [address]  [place] [address]  [phone]     Oct 07, 2024 1:45 PM  (Arrive by 1:30 PM)  Return Visit with JENNY Cardenas CNP  [facility] ([facility] ) [address]  [place] [address]  [phone]     Unable to complete prescription refill per RN Medication Refill Policy.    Joseph Vazquez RN on 7/16/2024 at 10:00 AM

## 2024-07-30 ENCOUNTER — OFFICE VISIT (OUTPATIENT)
Dept: INTERNAL MEDICINE | Facility: OTHER | Age: 84
End: 2024-07-30
Attending: STUDENT IN AN ORGANIZED HEALTH CARE EDUCATION/TRAINING PROGRAM
Payer: MEDICARE

## 2024-07-30 VITALS
DIASTOLIC BLOOD PRESSURE: 80 MMHG | WEIGHT: 166.2 LBS | HEIGHT: 66 IN | BODY MASS INDEX: 26.71 KG/M2 | RESPIRATION RATE: 20 BRPM | SYSTOLIC BLOOD PRESSURE: 132 MMHG | OXYGEN SATURATION: 99 % | TEMPERATURE: 97.9 F | HEART RATE: 80 BPM

## 2024-07-30 DIAGNOSIS — E87.6 DRUG-INDUCED HYPOKALEMIA: ICD-10-CM

## 2024-07-30 DIAGNOSIS — E53.8 VITAMIN B12 DEFICIENCY (NON ANEMIC): ICD-10-CM

## 2024-07-30 DIAGNOSIS — Z00.00 MEDICARE ANNUAL WELLNESS VISIT, SUBSEQUENT: Primary | ICD-10-CM

## 2024-07-30 DIAGNOSIS — R60.0 BILATERAL LOWER EXTREMITY EDEMA: ICD-10-CM

## 2024-07-30 DIAGNOSIS — T50.905A DRUG-INDUCED HYPOKALEMIA: ICD-10-CM

## 2024-07-30 DIAGNOSIS — I50.30 HEART FAILURE WITH PRESERVED EJECTION FRACTION, NYHA CLASS II (H): ICD-10-CM

## 2024-07-30 DIAGNOSIS — N18.31 CHRONIC KIDNEY DISEASE, STAGE 3A (H): ICD-10-CM

## 2024-07-30 DIAGNOSIS — E03.9 HYPOTHYROIDISM, UNSPECIFIED TYPE: ICD-10-CM

## 2024-07-30 DIAGNOSIS — I26.99 RECURRENT PULMONARY EMBOLISM (H): ICD-10-CM

## 2024-07-30 DIAGNOSIS — E78.00 PURE HYPERCHOLESTEROLEMIA: ICD-10-CM

## 2024-07-30 LAB
ALBUMIN SERPL BCG-MCNC: 3.9 G/DL (ref 3.5–5.2)
ALP SERPL-CCNC: 99 U/L (ref 40–150)
ALT SERPL W P-5'-P-CCNC: 9 U/L (ref 0–50)
ANION GAP SERPL CALCULATED.3IONS-SCNC: 9 MMOL/L (ref 7–15)
AST SERPL W P-5'-P-CCNC: 18 U/L (ref 0–45)
BASOPHILS # BLD AUTO: 0.1 10E3/UL (ref 0–0.2)
BASOPHILS NFR BLD AUTO: 1 %
BILIRUB SERPL-MCNC: 0.9 MG/DL
BUN SERPL-MCNC: 13.6 MG/DL (ref 8–23)
CALCIUM SERPL-MCNC: 9.9 MG/DL (ref 8.8–10.4)
CHLORIDE SERPL-SCNC: 96 MMOL/L (ref 98–107)
CHOLEST SERPL-MCNC: 175 MG/DL
CREAT SERPL-MCNC: 1.04 MG/DL (ref 0.51–0.95)
EGFRCR SERPLBLD CKD-EPI 2021: 53 ML/MIN/1.73M2
EOSINOPHIL # BLD AUTO: 0.1 10E3/UL (ref 0–0.7)
EOSINOPHIL NFR BLD AUTO: 1 %
ERYTHROCYTE [DISTWIDTH] IN BLOOD BY AUTOMATED COUNT: 13.4 % (ref 10–15)
FASTING STATUS PATIENT QL REPORTED: ABNORMAL
FASTING STATUS PATIENT QL REPORTED: NORMAL
GLUCOSE SERPL-MCNC: 99 MG/DL (ref 70–99)
HCO3 SERPL-SCNC: 35 MMOL/L (ref 22–29)
HCT VFR BLD AUTO: 49.7 % (ref 35–47)
HDLC SERPL-MCNC: 80 MG/DL
HGB BLD-MCNC: 15.3 G/DL (ref 11.7–15.7)
IMM GRANULOCYTES # BLD: 0 10E3/UL
IMM GRANULOCYTES NFR BLD: 0 %
LDLC SERPL CALC-MCNC: 78 MG/DL
LYMPHOCYTES # BLD AUTO: 1.9 10E3/UL (ref 0.8–5.3)
LYMPHOCYTES NFR BLD AUTO: 22 %
MCH RBC QN AUTO: 31.1 PG (ref 26.5–33)
MCHC RBC AUTO-ENTMCNC: 30.8 G/DL (ref 31.5–36.5)
MCV RBC AUTO: 101 FL (ref 78–100)
MONOCYTES # BLD AUTO: 0.7 10E3/UL (ref 0–1.3)
MONOCYTES NFR BLD AUTO: 9 %
NEUTROPHILS # BLD AUTO: 5.8 10E3/UL (ref 1.6–8.3)
NEUTROPHILS NFR BLD AUTO: 68 %
NONHDLC SERPL-MCNC: 95 MG/DL
NRBC # BLD AUTO: 0 10E3/UL
NRBC BLD AUTO-RTO: 0 /100
PLATELET # BLD AUTO: 321 10E3/UL (ref 150–450)
POTASSIUM SERPL-SCNC: 3.9 MMOL/L (ref 3.4–5.3)
PROT SERPL-MCNC: 7.5 G/DL (ref 6.4–8.3)
RBC # BLD AUTO: 4.92 10E6/UL (ref 3.8–5.2)
SODIUM SERPL-SCNC: 140 MMOL/L (ref 135–145)
TRIGL SERPL-MCNC: 86 MG/DL
TSH SERPL DL<=0.005 MIU/L-ACNC: 2.99 UIU/ML (ref 0.3–4.2)
WBC # BLD AUTO: 8.5 10E3/UL (ref 4–11)

## 2024-07-30 PROCEDURE — 85004 AUTOMATED DIFF WBC COUNT: CPT | Mod: ZL | Performed by: STUDENT IN AN ORGANIZED HEALTH CARE EDUCATION/TRAINING PROGRAM

## 2024-07-30 PROCEDURE — G0439 PPPS, SUBSEQ VISIT: HCPCS | Performed by: STUDENT IN AN ORGANIZED HEALTH CARE EDUCATION/TRAINING PROGRAM

## 2024-07-30 PROCEDURE — 84443 ASSAY THYROID STIM HORMONE: CPT | Mod: ZL | Performed by: STUDENT IN AN ORGANIZED HEALTH CARE EDUCATION/TRAINING PROGRAM

## 2024-07-30 PROCEDURE — 82040 ASSAY OF SERUM ALBUMIN: CPT | Mod: ZL | Performed by: STUDENT IN AN ORGANIZED HEALTH CARE EDUCATION/TRAINING PROGRAM

## 2024-07-30 PROCEDURE — 80061 LIPID PANEL: CPT | Mod: ZL | Performed by: STUDENT IN AN ORGANIZED HEALTH CARE EDUCATION/TRAINING PROGRAM

## 2024-07-30 PROCEDURE — G0463 HOSPITAL OUTPT CLINIC VISIT: HCPCS

## 2024-07-30 PROCEDURE — 99214 OFFICE O/P EST MOD 30 MIN: CPT | Mod: 25 | Performed by: STUDENT IN AN ORGANIZED HEALTH CARE EDUCATION/TRAINING PROGRAM

## 2024-07-30 PROCEDURE — 36415 COLL VENOUS BLD VENIPUNCTURE: CPT | Mod: ZL | Performed by: STUDENT IN AN ORGANIZED HEALTH CARE EDUCATION/TRAINING PROGRAM

## 2024-07-30 RX ORDER — FUROSEMIDE 20 MG
40 TABLET ORAL EVERY MORNING
Qty: 180 TABLET | Refills: 4 | Status: SHIPPED | OUTPATIENT
Start: 2024-07-30

## 2024-07-30 RX ORDER — LANOLIN ALCOHOL/MO/W.PET/CERES
1000 CREAM (GRAM) TOPICAL DAILY
Qty: 90 TABLET | Refills: 4 | Status: SHIPPED | OUTPATIENT
Start: 2024-07-30

## 2024-07-30 RX ORDER — POTASSIUM CHLORIDE 750 MG/1
10 TABLET, EXTENDED RELEASE ORAL DAILY
Qty: 90 TABLET | Refills: 4 | Status: SHIPPED | OUTPATIENT
Start: 2024-07-30

## 2024-07-30 RX ORDER — LEVOTHYROXINE SODIUM 50 UG/1
50 TABLET ORAL DAILY
Qty: 90 TABLET | Refills: 4 | Status: SHIPPED | OUTPATIENT
Start: 2024-07-30

## 2024-07-30 RX ORDER — ATORVASTATIN CALCIUM 20 MG/1
10 TABLET, FILM COATED ORAL DAILY
Qty: 45 TABLET | Refills: 4 | Status: SHIPPED | OUTPATIENT
Start: 2024-07-30

## 2024-07-30 ASSESSMENT — PAIN SCALES - GENERAL: PAINLEVEL: NO PAIN (0)

## 2024-07-30 NOTE — PROGRESS NOTES
Assessment & Plan         ICD-10-CM    1. Medicare annual wellness visit, subsequent  Z00.00       2. Pure hypercholesterolemia  E78.00 atorvastatin (LIPITOR) 20 MG tablet     Lipid Panel     Lipid Panel      3. Vitamin B12 deficiency (non anemic)  E53.8 cyanocobalamin (VITAMIN B-12) 1000 MCG tablet      4. Heart failure with preserved ejection fraction, NYHA class II (H)  I50.30 furosemide (LASIX) 20 MG tablet     CBC and Differential     Comprehensive Metabolic Panel     CBC and Differential     Comprehensive Metabolic Panel      5. Bilateral lower extremity edema  R60.0 furosemide (LASIX) 20 MG tablet      6. Hypothyroidism, unspecified type  E03.9 levothyroxine (SYNTHROID/LEVOTHROID) 50 MCG tablet     TSH Reflex GH     TSH Reflex GH      7. Drug-induced hypokalemia  E87.6 potassium chloride margaret ER (KLOR-CON M10) 10 MEQ CR tablet    T50.905A       8. Recurrent pulmonary embolism (H)  I26.99 rivaroxaban ANTICOAGULANT (XARELTO ANTICOAGULANT) 20 MG TABS tablet      9. Chronic kidney disease, stage 3a (H)  N18.31 Albumin Random Urine Quantitative with Creat Ratio     CANCELED: Albumin Random Urine Quantitative with Creat Ratio        Medicare wellness exam: Updated patient's past medical history, surgical history, social history, and medications.  Age appropriate laboratory results were ordered as above.  She has graduated from mammograms and colonoscopies and Pap smears.  Blood pressure is under control, weight is in an ideal range given her age.  Medications reviewed and updated.  Labs were ordered as above.  Unable to perform a urine sample today but we will repeat a microalbumin at next visit.  She will get RSV's vaccine at the pharmacy otherwise flu shot this fall.    Follow-up with PCP in 1 year for medication refills, sooner if needed.        No follow-ups on file.    Clarence Laureano MD  [facility]      Della Lind is a 84 year old, presenting for the following health  issues:  Recheck Medication        7/30/2024    12:54 PM   Additional Questions   Roomed by Francia gonzales     History of Present Illness       Reason for visit:  Renew medsShe consumes 0 sweetened beverage(s) daily.She exercises with enough effort to increase her heart rate 9 or less minutes per day.  She exercises with enough effort to increase her heart rate 3 or less days per week.   She is taking medications regularly.       She is here for medication refill.  She has no particular concerns other than the frustration of trying to get her medications filled.    Past medical history surgical history social history and medications.  No chest pain or shortness of breath.  No rashes or sores.      Annual Wellness Visit     Patient has been advised of split billing requirements and indicates understanding: Yes          Health Care Directive  Patient does not have a Health Care Directive or Living Will: Patient states has Advance Directive and will bring in a copy to clinic.  In general, how would you rate your overall physical health? (!) FAIR   Discussed with patient their rating of physical health; information has been provided.   Do you have a special diet?  Regular (no restrictions)        7/30/2024   Exercise, Social Connection, Stress   Days per week of moderate/strenous exercise 0 days   Average minutes spent exercising at this level 0 min   Frequency of gathering with friends or relatives Twice   Feel stress (tense, anxious, or unable to sleep) Not at all        Do you see a dentist two times every year?  Yes  Have you been more tired than usual lately?  (!) YES   Discussed possible causes of fatigue.   If you drink alcohol do you typically have >3 drinks per day or >7 drinks per week? No  Do you have a current opioid prescription? No  Do you use any other controlled substances or medications that are not prescribed by a provider? None  Social History     Tobacco Use    Smoking status: Former     Current packs/day:  0.00     Average packs/day: 0.1 packs/day for 10.0 years (1.0 ttl pk-yrs)     Types: Cigarettes     Start date:      Quit date: 1970     Years since quittin.6    Smokeless tobacco: Never    Tobacco comments:     Quit smoking: previously a social smoker in her college days    Vaping Use    Vaping status: Never Used   Substance Use Topics    Alcohol use: Not Currently     Comment: Alcoholic Drinks/day: occassionally    Drug use: Never       Needs assistance for the following daily activities: no assistance needed  Which of the following safety concerns are present in your home?  none identified   Do you (or your family members) have any concerns about your safety while driving?  No  Do you have any of the following hearing concerns?: No hearing concerns  In the past 6 months, have you been bothered by leaking of urine? No            2024   Fall Risk   Fallen 2 or more times in the past year? No   Trouble with walking or balance? No             Today's PHQ-2 Score:       2024    12:37 PM   PHQ-2 (  [employer])   Q1: Little interest or pleasure in doing things 0   Q2: Feeling down, depressed or hopeless 0   PHQ-2 Score 0   Q1: Little interest or pleasure in doing things Not at all   Q2: Feeling down, depressed or hopeless Not at all   PHQ-2 Score 0              Reviewed and updated as needed this visit by Provider                    Past Medical History:   Diagnosis Date    Personal history of pulmonary embolism     5/15/2015    Personal history of transient ischemic attack (TIA), and cerebral infarction without residual deficits     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     Lab work is in process  Labs reviewed in EPIC    Current providers sharing in care for this patient include:  Patient Care Team:  Robert Herrera MD as PCP - General (Family Practice)  Robert Herrera MD as Assigned PCP  Tere Cárdenas APRN CNP as Assigned Heart and Vascular Provider    The following health maintenance items are reviewed in Epic and correct as of today:  Health Maintenance   Topic Date Due    HF ACTION PLAN  Never done    MICROALBUMIN  Never done    URINALYSIS  Never done    RSV VACCINE (Pregnancy & 60+) (1 - 1-dose 60+ series) Never done    DTAP/TDAP/TD IMMUNIZATION (2 - Td or Tdap) 10/04/2021    MEDICARE ANNUAL WELLNESS VISIT  09/02/2023    LIPID  09/02/2023    COVID-19 Vaccine (7 - 2023-24 season) 02/02/2024    CBC  07/03/2024    BMP  07/09/2024    ALT  07/17/2024    HEMOGLOBIN  07/03/2024    INFLUENZA VACCINE (1) 09/01/2024    FALL RISK ASSESSMENT  07/30/2025    DEXA  06/15/2026    ADVANCE CARE PLANNING  09/02/2027    TSH W/FREE T4 REFLEX  Completed    PHQ-2 (once per calendar year)  Completed    Pneumococcal Vaccine: 65+ Years  Completed    ZOSTER IMMUNIZATION  Completed    IPV IMMUNIZATION  Aged Out    HPV IMMUNIZATION  Aged Out    MENINGITIS IMMUNIZATION  Aged Out    RSV MONOCLONAL ANTIBODY  Aged Out       Appropriate preventive services were discussed with this patient, including applicable screening as appropriate for fall prevention, nutrition, physical activity, Tobacco-use cessation, weight loss and cognition.  Checklist reviewing preventive services available has been given to the patient.           7/30/2024   Mini Cog   Clock Draw Score 2 Normal   3 Item Recall 3 objects recalled   Mini Cog Total Score 5                     Objective    /80   Pulse 80   Temp 97.9  F (36.6  C) (Temporal)   Resp 20   Ht 1.664 m (5' 5.5\")   Wt 75.4 kg (166 lb 3.2 oz)   LMP  (LMP " Unknown)   SpO2 99%   Breastfeeding No   BMI 27.24 kg/m    Body mass index is 27.24 kg/m .  Physical Exam  Constitutional:       General: She is not in acute distress.     Appearance: She is well-developed. She is not diaphoretic.   HENT:      Head: Normocephalic and atraumatic.      Right Ear: Tympanic membrane, ear canal and external ear normal. There is no impacted cerumen.      Left Ear: Tympanic membrane, ear canal and external ear normal. There is no impacted cerumen.      Mouth/Throat:      Mouth: Mucous membranes are moist.      Pharynx: Oropharynx is clear.   Eyes:      General: No scleral icterus.     Conjunctiva/sclera: Conjunctivae normal.   Neck:      Vascular: No carotid bruit.   Cardiovascular:      Rate and Rhythm: Normal rate and regular rhythm.      Heart sounds: No murmur heard.  Pulmonary:      Effort: Pulmonary effort is normal. No respiratory distress.      Breath sounds: Normal breath sounds. No wheezing.   Abdominal:      Palpations: Abdomen is soft.      Tenderness: There is no abdominal tenderness.   Musculoskeletal:         General: No deformity.      Cervical back: Neck supple.      Right lower leg: No edema.      Left lower leg: No edema.   Skin:     General: Skin is warm and dry.      Coloration: Skin is not jaundiced.      Findings: No rash.   Neurological:      Mental Status: She is alert. Mental status is at baseline.   Psychiatric:         Mood and Affect: Mood normal.         Behavior: Behavior normal.                Signed Electronically by: Clarence Laureano MD

## 2024-07-30 NOTE — NURSING NOTE
Patient is here for medication check, to get refills. States was not able to get in with her PCP.     No LMP recorded (lmp unknown). Patient is postmenopausal.  Medication Reconciliation: complete    Francia Mason LPN 7/30/2024 1:01 PM       Advance care directive on file? no  Advance care directive provided to patient? no       Francia Mason LPN

## 2024-07-30 NOTE — LETTER
July 31, 2024      [address] R[address]os  [address] [place] [address]        Dear ,    We are writing to inform you of your test results.    Your test results fall within the expected range(s) or remain unchanged from previous results.  Please continue with current treatment plan.    Resulted Orders   TSH Reflex GH   Result Value Ref Range    TSH 2.99 0.30 - 4.20 uIU/mL   Comprehensive Metabolic Panel   Result Value Ref Range    Sodium 140 135 - 145 mmol/L    Potassium 3.9 3.4 - 5.3 mmol/L    Carbon Dioxide (CO2) 35 (H) 22 - 29 mmol/L    Anion Gap 9 7 - 15 mmol/L    Urea Nitrogen 13.6 8.0 - 23.0 mg/dL    Creatinine 1.04 (H) 0.51 - 0.95 mg/dL    GFR Estimate 53 (L) >60 mL/min/1.73m2      Comment:      eGFR calculated using 2021 CKD-EPI equation.    Calcium 9.9 8.8 - 10.4 mg/dL      Comment:      Reference intervals for this test were updated on 7/16/2024 to reflect our healthy population more accurately. There may be differences in the flagging of prior results with similar values performed with this method. Those prior results can be interpreted in the context of the updated reference intervals.    Chloride 96 (L) 98 - 107 mmol/L    Glucose 99 70 - 99 mg/dL    Alkaline Phosphatase 99 40 - 150 U/L    AST 18 0 - 45 U/L    ALT 9 0 - 50 U/L    Protein Total 7.5 6.4 - 8.3 g/dL    Albumin 3.9 3.5 - 5.2 g/dL    Bilirubin Total 0.9 <=1.2 mg/dL    Patient Fasting > 8hrs? Unknown    Lipid Panel   Result Value Ref Range    Cholesterol 175 <200 mg/dL    Triglycerides 86 <150 mg/dL    Direct Measure HDL 80 >=50 mg/dL    LDL Cholesterol Calculated 78 <=100 mg/dL    Non HDL Cholesterol 95 <130 mg/dL    Patient Fasting > 8hrs? Unknown     Narrative    Cholesterol  Desirable:  <200 mg/dL    Triglycerides  Normal:  Less than 150 mg/dL  Borderline High:  150-199 mg/dL  High:  200-499 mg/dL  Very High:  Greater than or equal to 500 mg/dL    Direct Measure HDL  Female:  Greater than or equal to 50 mg/dL   Male:  Greater  than or equal to 40 mg/dL    LDL Cholesterol  Desirable:  <100mg/dL  Above Desirable:  100-129 mg/dL   Borderline High:  130-159 mg/dL   High:  160-189 mg/dL   Very High:  >= 190 mg/dL    Non HDL Cholesterol  Desirable:  130 mg/dL  Above Desirable:  130-159 mg/dL  Borderline High:  160-189 mg/dL  High:  190-219 mg/dL  Very High:  Greater than or equal to 220 mg/dL   CBC with platelets and differential   Result Value Ref Range    WBC Count 8.5 4.0 - 11.0 10e3/uL    RBC Count 4.92 3.80 - 5.20 10e6/uL    Hemoglobin 15.3 11.7 - 15.7 g/dL    Hematocrit 49.7 (H) 35.0 - 47.0 %     (H) 78 - 100 fL    MCH 31.1 26.5 - 33.0 pg    MCHC 30.8 (L) 31.5 - 36.5 g/dL    RDW 13.4 10.0 - 15.0 %    Platelet Count 321 150 - 450 10e3/uL    % Neutrophils 68 %    % Lymphocytes 22 %    % Monocytes 9 %    % Eosinophils 1 %    % Basophils 1 %    % Immature Granulocytes 0 %    NRBCs per 100 WBC 0 <1 /100    Absolute Neutrophils 5.8 1.6 - 8.3 10e3/uL    Absolute Lymphocytes 1.9 0.8 - 5.3 10e3/uL    Absolute Monocytes 0.7 0.0 - 1.3 10e3/uL    Absolute Eosinophils 0.1 0.0 - 0.7 10e3/uL    Absolute Basophils 0.1 0.0 - 0.2 10e3/uL    Absolute Immature Granulocytes 0.0 <=0.4 10e3/uL    Absolute NRBCs 0.0 10e3/uL       If you have any questions or concerns, please call the clinic at the number listed above.       Sincerely,      Clarence Laureano MD

## 2024-08-14 ENCOUNTER — TRANSFERRED RECORDS (OUTPATIENT)
Dept: HEALTH INFORMATION MANAGEMENT | Facility: OTHER | Age: 84
End: 2024-08-14

## 2024-08-14 LAB
ALT SERPL-CCNC: 16 U/L (ref 9–52)
AST SERPL-CCNC: 27 U/L (ref 14–36)
CREATININE (EXTERNAL): 0.9 MG/DL (ref 0.66–1.25)
GLUCOSE (EXTERNAL): 120 MG/DL (ref 74–106)
INR (EXTERNAL): 1.2 (ref 2–3)
POTASSIUM (EXTERNAL): 3.8 MMOL/L (ref 3.5–5.1)

## 2024-10-07 ENCOUNTER — OFFICE VISIT (OUTPATIENT)
Dept: CARDIOLOGY | Facility: OTHER | Age: 84
End: 2024-10-07
Attending: NURSE PRACTITIONER
Payer: MEDICARE

## 2024-10-07 VITALS
BODY MASS INDEX: 27 KG/M2 | WEIGHT: 168 LBS | RESPIRATION RATE: 16 BRPM | DIASTOLIC BLOOD PRESSURE: 80 MMHG | HEIGHT: 66 IN | TEMPERATURE: 97.2 F | SYSTOLIC BLOOD PRESSURE: 150 MMHG | OXYGEN SATURATION: 93 % | HEART RATE: 73 BPM

## 2024-10-07 DIAGNOSIS — R60.0 BILATERAL LOWER EXTREMITY EDEMA: ICD-10-CM

## 2024-10-07 DIAGNOSIS — I10 ESSENTIAL HYPERTENSION: ICD-10-CM

## 2024-10-07 DIAGNOSIS — I50.30 HEART FAILURE WITH PRESERVED EJECTION FRACTION, NYHA CLASS II (H): Primary | ICD-10-CM

## 2024-10-07 DIAGNOSIS — E87.6 DRUG-INDUCED HYPOKALEMIA: ICD-10-CM

## 2024-10-07 DIAGNOSIS — T50.905A DRUG-INDUCED HYPOKALEMIA: ICD-10-CM

## 2024-10-07 PROCEDURE — 99214 OFFICE O/P EST MOD 30 MIN: CPT | Performed by: NURSE PRACTITIONER

## 2024-10-07 PROCEDURE — G0463 HOSPITAL OUTPT CLINIC VISIT: HCPCS

## 2024-10-07 ASSESSMENT — PAIN SCALES - GENERAL: PAINLEVEL: NO PAIN (0)

## 2024-10-07 NOTE — NURSING NOTE
"Patient comes in for follow up.    Chief Complaint   Patient presents with    Follow Up       Initial BP (!) 168/98 (BP Location: Right arm, Patient Position: Sitting, Cuff Size: Adult Regular)   Pulse 73   Temp 97.2  F (36.2  C) (Temporal)   Resp 16   Ht 1.664 m (5' 5.5\")   Wt 76.2 kg (168 lb)   LMP  (LMP Unknown)   SpO2 93%   BMI 27.53 kg/m   Estimated body mass index is 27.53 kg/m  as calculated from the following:    Height as of this encounter: 1.664 m (5' 5.5\").    Weight as of this encounter: 76.2 kg (168 lb).  Meds Reconciled: complete  Pt is on Aspirin  Pt is on a Statin  PHQ and/or ROSE MARIE reviewed. Pt referred to PCP/MH Provider as appropriate.    Arianna Tijerina LPN      "
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
## 2024-10-07 NOTE — PATIENT INSTRUCTIONS
Return to cardiology clinic in 1-2 weeks for repeat blood pressure check.   Follow-up with cardiology in one year, sooner if needed.

## 2024-10-17 ENCOUNTER — ALLIED HEALTH/NURSE VISIT (OUTPATIENT)
Dept: CARDIOLOGY | Facility: OTHER | Age: 84
End: 2024-10-17
Attending: NURSE PRACTITIONER
Payer: MEDICARE

## 2024-10-17 VITALS
DIASTOLIC BLOOD PRESSURE: 82 MMHG | RESPIRATION RATE: 12 BRPM | OXYGEN SATURATION: 89 % | SYSTOLIC BLOOD PRESSURE: 142 MMHG | TEMPERATURE: 98 F | BODY MASS INDEX: 27.73 KG/M2 | HEART RATE: 80 BPM | WEIGHT: 169.2 LBS

## 2024-10-17 DIAGNOSIS — I10 ESSENTIAL HYPERTENSION: ICD-10-CM

## 2024-10-17 DIAGNOSIS — I50.30 HEART FAILURE WITH PRESERVED EJECTION FRACTION, NYHA CLASS II (H): ICD-10-CM

## 2024-10-17 RX ORDER — VALSARTAN 160 MG/1
160 TABLET ORAL 2 TIMES DAILY
Qty: 180 TABLET | Refills: 3 | Status: SHIPPED | OUTPATIENT
Start: 2024-10-17

## 2024-10-17 ASSESSMENT — PAIN SCALES - GENERAL: PAINLEVEL: NO PAIN (0)

## 2024-10-17 NOTE — NURSING NOTE
"JENNY Dumas CNP notified of patient's vitals and recommended:    \"Increase Diovan 160 mg BID.   Follow-up with cardiology in 3 months- recheck HTN and SPO2.  JENNY Nation CNP\"    Patient notified of this and verbalized understanding.  Patient brought to scheduling to make a 3 month follow up appointment.  Sridevi Watts RN......October 17, 2024...10:58 AM   "

## 2024-10-17 NOTE — PROGRESS NOTES
Increase Diovan 160 mg BID.   Follow-up with cardiology in 3 months- recheck HTN and SPO2.  JENNY Nation CNP

## 2024-10-17 NOTE — PATIENT INSTRUCTIONS
Increase Diovan (valsartan) to 160 mg by mouth twice daily.   Follow-up with cardiology in 3 months- recheck hypertension (blood pressure) and oxygen level.

## 2025-01-16 ENCOUNTER — OFFICE VISIT (OUTPATIENT)
Dept: CARDIOLOGY | Facility: OTHER | Age: 85
End: 2025-01-16
Attending: NURSE PRACTITIONER
Payer: MEDICARE

## 2025-01-16 VITALS
HEART RATE: 75 BPM | BODY MASS INDEX: 26.84 KG/M2 | TEMPERATURE: 97.2 F | HEIGHT: 66 IN | WEIGHT: 167 LBS | RESPIRATION RATE: 18 BRPM | SYSTOLIC BLOOD PRESSURE: 142 MMHG | OXYGEN SATURATION: 92 % | DIASTOLIC BLOOD PRESSURE: 80 MMHG

## 2025-01-16 DIAGNOSIS — T50.905A DRUG-INDUCED HYPOKALEMIA: ICD-10-CM

## 2025-01-16 DIAGNOSIS — I50.30 HEART FAILURE WITH PRESERVED EJECTION FRACTION, NYHA CLASS II (H): Primary | ICD-10-CM

## 2025-01-16 DIAGNOSIS — E87.6 DRUG-INDUCED HYPOKALEMIA: ICD-10-CM

## 2025-01-16 DIAGNOSIS — I10 ESSENTIAL HYPERTENSION: ICD-10-CM

## 2025-01-16 PROCEDURE — G0463 HOSPITAL OUTPT CLINIC VISIT: HCPCS

## 2025-01-16 RX ORDER — LISINOPRIL 20 MG/1
20 TABLET ORAL DAILY
Qty: 90 TABLET | Refills: 3 | Status: SHIPPED | OUTPATIENT
Start: 2025-01-16 | End: 2025-01-16

## 2025-01-16 RX ORDER — LISINOPRIL 20 MG/1
20 TABLET ORAL EVERY MORNING
Qty: 90 TABLET | Refills: 3 | Status: SHIPPED | OUTPATIENT
Start: 2025-01-16

## 2025-01-16 ASSESSMENT — PAIN SCALES - GENERAL: PAINLEVEL_OUTOF10: NO PAIN (0)

## 2025-01-16 NOTE — NURSING NOTE
"Chief Complaint   Patient presents with    Follow Up     3 months-meds       Initial BP (!) 152/90 (BP Location: Right arm, Patient Position: Sitting, Cuff Size: Adult Regular)   Pulse 75   Temp 97.2  F (36.2  C) (Temporal)   Resp 18   Ht 1.664 m (5' 5.5\")   Wt 75.8 kg (167 lb)   LMP  (LMP Unknown)   SpO2 92%   BMI 27.37 kg/m   Estimated body mass index is 27.37 kg/m  as calculated from the following:    Height as of this encounter: 1.664 m (5' 5.5\").    Weight as of this encounter: 75.8 kg (167 lb).  Meds Reconciled: complete  Pt is on Aspirin  Pt is on a Statin  PHQ and/or ROSE MARIE reviewed. Pt referred to PCP/MH Provider as appropriate.    Arianna Tijerina LPN      "

## 2025-01-16 NOTE — PROGRESS NOTES
"[facility] HEART CARE   CARDIOLOGY PROGRESS NOTE    Lelo Ríos   [address]  [facility] [address]    Robert Herrera     Chief Complaint   Patient presents with    Follow Up     3 months-meds        HPI:   Mrs. Ríos is an 84 year old female who presents for cardiology follow-up to visit on 10/17/2024 with chronic HFpEF and uncontrolled HTN.  Additional history includes hyperlipidemia, hypertension, history of recurrent pulmonary embolism on chronic oral anticoagulation, stage III CKD and obesity.    Patient with elevated BNP suggestive of congestive heart failure, TTE (7/14/2023) indeterminate LV diastolic function on last imaging, concentric remodeling present. LV systolic function preserved, LVEF 60-65%.    At visit in September (2023) patient described feeling \"no stamina\", edema improved since started on loop diuretic. She reported a 12 lb weight loss and feeling better. Dyspnea improved. She denied any recurrence of lightheadedness. No palpitations.  No syncope.  She denies experiencing any chest pain or pressure.    INTERVAL HISTORY:  Today, patient reports feeling fine. She denies any chest pain or pressure. No palpitations, lightheadedness or syncope. Chronic LE edema, controlled on Lasix 40 mg daily. Weight remains stable. Chronic exertional dyspnea, largely unchanged. Persisiting hypertension, not improved following recent Valsartan dose increase.     RELEVANT TESTING REVIEWED:  Echocardiogram 7/14/2023  Interpretation Summary  Global and regional left ventricular function is normal with an EF of 60-65%.  Mild right ventricular dilation is present. Global right ventricular function  is normal.  The peak velocity of the tricuspid regurgitant jet is not obtainable.Pulmonary  artery systolic pressure cannot be assessed.  IVC diameter >2.1 cm collapsing <50% with sniff suggests a high RA pressure  estimated at 15 mmHg or greater.    NM Lexiscan stress test 8/11/2020    The nuclear stress test is " negative for inducible myocardial ischemia   or infarction.     Left ventricular function is normal.     The left ventricular ejection fraction at rest is 87%.  The left   ventricular ejection fraction at stress is 85%.     There is no prior study for comparison.     Procedure: The patient underwent a Lexiscan stress test.     Findings: Resting EKG normal.  Lexiscan and Cardiolite were injected   without difficulty.  The patient's EKG remained unchanged.  Her vital   signs remained stable.     Impression: Completed Lexiscan stress test.  Images for ischemia pending.     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          FAMILY HISTORY:   Family History   Problem Relation Age of Onset    Heart Disease Father         Heart Disease    Other - See Comments Mother         liver cancer    Breast Cancer No family hx of         Cancer-breast          PAST SURGICAL HISTORY:   Past Surgical History:   Procedure Laterality Date    APPENDECTOMY OPEN      No Comments Provided    HYSTERECTOMY TOTAL ABDOMINAL, BILATERAL SALPINGO-OOPHORECTOMY, COMBINED      fibroid uterus with incidental appendectomy    OTHER SURGICAL HISTORY      205982,BIOPSY THYROID MEDICAL IMAGING,benign thyroid aspiration    TONSILLECTOMY      born without tonsils          SOCIAL HISTORY:   Social History     Socioeconomic History    Marital status: Single     Spouse name: None    Number of children: None    Years of education: None    Highest education level: None   Tobacco Use    Smoking status: Former    Smokeless tobacco: Never    Tobacco comments:     Quit smoking: previously a social smoker   Vaping Use    Vaping Use: Never used   Substance  and Sexual Activity    Alcohol use: Not Currently     Comment: Alcoholic Drinks/day: occassionally    Drug use: Never    Sexual activity: Not Currently     Partners: Male   Social History Narrative    Single.  No children but did raise twin boys for a period of time.   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   She has recently retired from her position at [employer].   She also     works for [employer] for [employer].          CURRENT MEDICATIONS:   Current Outpatient Medications   Medication Sig Dispense Refill    aspirin 81 MG tablet Take 81 mg by mouth daily      atorvastatin (LIPITOR) 20 MG tablet Take 0.5 tablets (10 mg) by mouth daily 45 tablet 4    Calcium Carb-Cholecalciferol (CALCIUM 600 + D PO) Take 1 tablet by mouth 2 times daily      cyanocobalamin (VITAMIN B-12) 1000 MCG tablet Take 1 tablet (1,000 mcg) by mouth daily 90 tablet 4    furosemide (LASIX) 20 MG tablet Take 2 tablets (40 mg) by mouth every morning 180 tablet 4    latanoprost (XALATAN) 0.005 % ophthalmic solution Place 1 drop into both eyes At Bedtime      levothyroxine (SYNTHROID/LEVOTHROID) 50 MCG tablet Take 1 tablet (50 mcg) by mouth daily 90 tablet 4    potassium chloride margaret ER (KLOR-CON M10) 10 MEQ CR tablet Take 1 tablet (10 mEq) by mouth daily 90 tablet 4    rivaroxaban ANTICOAGULANT (XARELTO ANTICOAGULANT) 20 MG TABS tablet Take 1 tablet (20 mg) by mouth daily (with dinner) 90 tablet 4    timolol maleate (TIMOPTIC) 0.5 % ophthalmic solution Place 1 drop into the right eye daily      valsartan (DIOVAN) 160 MG tablet Take 1 tablet (160 mg) by mouth 2 times daily. 180 tablet 3     No current facility-administered medications for this visit.     ALLERGIES:   No Known Allergies       ROS:   CONSTITUTIONAL: No reported fever or chills. Weight stable.   ENT: No visual disturbance, ear ache, epistaxis or sore throat.   CARDIOVASCULAR: No chest pain, chest pressure or  "chest discomfort. No palpitations. Patient denies noticing any increased edema.   RESPIRATORY: Chronic exertional dyspnea, stable without progression. No cough, wheezing or hemoptysis.  No reported orthopnea or PND.  GI: No reported abdominal pain, melena or hematochezia.   : No reported hematuria or dysuria.   NEUROLOGICAL: No lightheadedness, dizziness, syncope, ataxia, paresthesias or weakness.   HEMATOLOGIC: No history of anemia. No bleeding or excessive bruising.  Positive for history of recurrent pulmonary embolism on DOAC.  MUSCULOSKELETAL: No new joint pain or swelling, no muscle pain.  ENDOCRINOLOGIC: No temperature intolerance. No hair or skin changes.  SKIN: No abnormal rashes or sores, no unusual itching.      PHYSICAL EXAM:   BP (!) 142/80 (BP Location: Right arm, Patient Position: Sitting, Cuff Size: Adult Regular)   Pulse 75   Temp 97.2  F (36.2  C) (Temporal)   Resp 18   Ht 1.664 m (5' 5.5\")   Wt 75.8 kg (167 lb)   LMP  (LMP Unknown)   SpO2 92%   BMI 27.37 kg/m    GENERAL: The patient is a well-developed, well-nourished, in no apparent distress.  HEENT: Head is normocephalic and atraumatic. Eyes are symmetrical with normal visual tracking. No icterus, no xanthelasmas. Nares appeared normal without nasal drainage. Mucous membranes are moist, no cyanosis.  NECK: Supple, no cervical bruits, JVP not visible.   CHEST/ LUNGS: Lungs clear to auscultation, no rales, rhonchi or wheezes, no use of accessory muscles, no retractions, respirations unlabored and normal respiratory rate.   CARDIO: Regular rate and rhythm normal with S1 and S2, no S3 or S4 and no murmur, click or rub.   ABD: Abdomen is nondistended.   EXTREMITIES: trace- 1+ LE edema present bilaterally.    MUSCULOSKELETAL: No visible joint swelling.   NEUROLOGIC: Alert and oriented X3. Normal speech, gait and affect. No focal neurologic deficits.   SKIN: No jaundice. No rashes or visible skin lesions present. No ecchymosis.     LAB " RESULTS:   Transferred Records on 08/14/2024   Component Date Value Ref Range Status    Potassium (External) 08/14/2024 3.8  3.5 - 5.1 MMOL/L Final    Glucose (External) 08/14/2024 120 (A)  74 - 106 mg/dL Final    Creatinine (External) 08/14/2024 0.90  0.66 - 1.25 mg/dL Final    AST (External) 08/14/2024 27  14 - 36 U/L Final    ALT (External) 08/14/2024 16  9 - 52 U/L Final    INR (External) 08/14/2024 1.2 (A)  2.0 - 3.0 Final         ASSESSMENT:   Lelo Ríos presents for cardiology follow-up to visit on 10/17/2024 with chronic HFpEF and uncontrolled HTN.  Additional history includes hyperlipidemia, hypertension, history of recurrent pulmonary embolism on chronic oral anticoagulation, stage III CKD and obesity.  Today, patient reports feeling fine. She denies any chest pain or pressure. No palpitations, lightheadedness or syncope. Chronic LE edema, controlled on Lasix 40 mg daily. Weight remains stable. Chronic exertional dyspnea, largely unchanged. Persisiting hypertension, not improved following recent Valsartan dose increase.     PLAN:   1. Heart failure with preserved ejection fraction, NYHA class II (H) (Primary)  -Chronic HFpEF, TTE (7/14/2023) indeterminate LV diastolic function on last imaging, concentric remodeling present. LV systolic function preserved, LVEF 60-65%.  -No recent CHF hospitalizations or end organ dysfunction.  -Continue on Lasix 40 mg daily and potassium 10 meq daily.   -Continue with sodium restricted diet, <2,500 mg/ day.   -Need for improved HTN control.    2. Drug-induced hypokalemia  -Continue on potassium supplement as ordered.     3. Essential hypertension  -Has not responded much to ARB, no change following dose increase of Valsartan.   -She will stop Valsartan and start Lisinopril 20 mg daily in the morning.   -BP goal 135/80 mmHg or less.     - lisinopril (ZESTRIL) 20 MG tablet; Take 1 tablet (20 mg) by mouth every morning.  Dispense: 90 tablet; Refill: 3      Thank you for  allowing me to participate in the care of your patient. Please do not hesitate to contact me if you have any questions.     JENNY Nation CNP [facility]

## 2025-01-16 NOTE — PATIENT INSTRUCTIONS
Stop Valsartan at this time.  Start Lisinopril 20 mg once daily in the morning.   Continue on Furosemide 40 mg daily (take two 20 mg tabs).  Follow-up with cardiology in October as scheduled, sooner if needed.

## (undated) RX ORDER — REGADENOSON 0.08 MG/ML
INJECTION, SOLUTION INTRAVENOUS
Status: DISPENSED
Start: 2020-08-11